# Patient Record
Sex: FEMALE | Race: WHITE | NOT HISPANIC OR LATINO | URBAN - METROPOLITAN AREA
[De-identification: names, ages, dates, MRNs, and addresses within clinical notes are randomized per-mention and may not be internally consistent; named-entity substitution may affect disease eponyms.]

---

## 2017-02-23 ENCOUNTER — EMERGENCY (EMERGENCY)
Facility: HOSPITAL | Age: 82
LOS: 1 days | Discharge: HOME | End: 2017-02-23
Attending: EMERGENCY MEDICINE | Admitting: EMERGENCY MEDICINE
Payer: MEDICARE

## 2017-02-23 VITALS
TEMPERATURE: 99 F | RESPIRATION RATE: 18 BRPM | OXYGEN SATURATION: 98 % | HEART RATE: 79 BPM | SYSTOLIC BLOOD PRESSURE: 137 MMHG | DIASTOLIC BLOOD PRESSURE: 97 MMHG

## 2017-02-23 VITALS
SYSTOLIC BLOOD PRESSURE: 110 MMHG | RESPIRATION RATE: 18 BRPM | OXYGEN SATURATION: 97 % | HEART RATE: 82 BPM | DIASTOLIC BLOOD PRESSURE: 60 MMHG

## 2017-02-23 DIAGNOSIS — Z90.49 ACQUIRED ABSENCE OF OTHER SPECIFIED PARTS OF DIGESTIVE TRACT: Chronic | ICD-10-CM

## 2017-02-23 DIAGNOSIS — M25.511 PAIN IN RIGHT SHOULDER: ICD-10-CM
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
## 2017-02-23 DIAGNOSIS — S42.031A DISPLACED FRACTURE OF LATERAL END OF RIGHT CLAVICLE, INITIAL ENCOUNTER FOR CLOSED FRACTURE: ICD-10-CM
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
## 2017-02-23 DIAGNOSIS — M70.62 TROCHANTERIC BURSITIS, LEFT HIP: ICD-10-CM

## 2017-02-23 PROCEDURE — 72192 CT PELVIS W/O DYE: CPT

## 2017-02-23 PROCEDURE — 76377 3D RENDER W/INTRP POSTPROCES: CPT | Mod: 26

## 2017-02-23 PROCEDURE — 73000 X-RAY EXAM OF COLLAR BONE: CPT

## 2017-02-23 PROCEDURE — 73522 X-RAY EXAM HIPS BI 3-4 VIEWS: CPT | Mod: 26

## 2017-02-23 PROCEDURE — 99284 EMERGENCY DEPT VISIT MOD MDM: CPT | Mod: GC

## 2017-02-23 PROCEDURE — 76377 3D RENDER W/INTRP POSTPROCES: CPT

## 2017-02-23 PROCEDURE — 71010: CPT | Mod: 26

## 2017-02-23 PROCEDURE — 73522 X-RAY EXAM HIPS BI 3-4 VIEWS: CPT

## 2017-02-23 PROCEDURE — 73030 X-RAY EXAM OF SHOULDER: CPT

## 2017-02-23 PROCEDURE — 71045 X-RAY EXAM CHEST 1 VIEW: CPT

## 2017-02-23 PROCEDURE — 99284 EMERGENCY DEPT VISIT MOD MDM: CPT | Mod: 25

## 2017-02-23 PROCEDURE — 73030 X-RAY EXAM OF SHOULDER: CPT | Mod: 26,RT

## 2017-02-23 PROCEDURE — 73000 X-RAY EXAM OF COLLAR BONE: CPT | Mod: 26,RT

## 2017-02-23 PROCEDURE — 72192 CT PELVIS W/O DYE: CPT | Mod: 26

## 2017-02-23 RX ORDER — ACETAMINOPHEN 500 MG
650 TABLET ORAL ONCE
Qty: 0 | Refills: 0 | Status: COMPLETED | OUTPATIENT
Start: 2017-02-23 | End: 2017-02-23

## 2017-02-23 RX ADMIN — Medication 650 MILLIGRAM(S): at 13:03

## 2017-02-23 RX ADMIN — Medication 650 MILLIGRAM(S): at 15:03

## 2017-02-23 NOTE — ED PROVIDER NOTE - CARE PLAN
Principal Discharge DX:	AC separation, right, initial encounter Principal Discharge DX:	Closed displaced fracture of acromial end of right clavicle, initial encounter  Instructions for follow-up, activity and diet:	1. Return to ED for worsening, progressive or any other concerning symptoms   2. Follow up with your primary care doctor in 2-3days   3. Take Tylenol up to 650 mg every 6 hours as needed for pain.   4. Rest, apply ice over covered skin for no more than 15 minutes at a time, keep affected extremity elevated, use compressive dressing or splint as provided and instructed.   5. Follow up with orthopedic clinic [phone]  Secondary Diagnosis:	Greater trochanteric bursitis, left Principal Discharge DX:	Closed displaced fracture of acromial end of right clavicle, initial encounter  Instructions for follow-up, activity and diet:	1. Return to ED for worsening, progressive or any other concerning symptoms   2. Follow up with your primary care doctor in 2-3days   3. Take Tylenol up to 650 mg every 6 hours as needed for pain.   4. Rest, apply ice over covered skin for no more than 15 minutes at a time, keep affected extremity elevated, use compressive dressing or splint as provided and instructed.   5. Follow up with orthopedic clinic [phone]  Secondary Diagnosis:	Greater trochanteric bursitis, left Principal Discharge DX:	Closed displaced fracture of acromial end of right clavicle, initial encounter  Instructions for follow-up, activity and diet:	1. Return to ED for worsening, progressive or any other concerning symptoms   2. Follow up with your primary care doctor in 2-3days   3. Take Tylenol up to 650 mg every 6 hours as needed for pain.   4. Rest, apply ice over covered skin for no more than 15 minutes at a time, keep affected extremity elevated, use compressive dressing or splint as provided and instructed.   5. Follow up with orthopedic clinic [phone]  Secondary Diagnosis:	Greater trochanteric bursitis, left

## 2017-02-23 NOTE — ED PROCEDURE NOTE - NS ED PERI NEURO NEG
The patient/caregiver verbalized understanding of how to care for the injured extremity with splint/Post-application: Motor, sensory, and vascular responses intact in the injured extremity.

## 2017-02-23 NOTE — ED PROCEDURE NOTE - NS ED PERI VASCULAR NEG
no paresthesia/no cyanosis of extremity/capillary refill time < 2 seconds/fingers/toes warm to touch/no swelling

## 2017-02-23 NOTE — ED PROVIDER NOTE - OBJECTIVE STATEMENT
89yo F with mechanical fall >1wk ago seen at [facility], not on A/C, had CXR/Xray pelvis and D/C told no fx. was unable to ambulate until monday, now ambulating but with a shuffling gait, pain and use of walker which didn't use prior. increased bruising/swelling rt shoulder, called PCP who ordered xray last night. has clavicle fx. sent to ED to be evaluated. patient has dementia, and chronic pain. unable to say if she has any new pain. at mental status baseline per family.

## 2017-02-23 NOTE — ED PROVIDER NOTE - PROGRESS NOTE DETAILS
will CT left hip due to questionable lucency/cortical defect to r/o fx -Slowey DO CT greater troch fracture on left side, no intertroch fx, will prepare for D/C. Waiting to hear from ortho regarding distal clavicle fracture due to significant displacement -Etelvina PEÑA CT recommending MRI to completely r/o interoch fx. discussion with patients family, they do not want admission for MRI. They understand the risk of returning home, walking on a potential fracture and if to become displaced requiring surgery and or resulting in being bed bound and ultimately death. With the understand of potential consequences the family will like to take the patient home. She is able to ambulate with walker -Etelvina PEÑA CT recommending MRI to completely r/o interoch fx. discussion with patients family, they do not want admission for MRI. They understand the risk of returning home, walking on a potential fracture and if to become displaced requiring surgery and/or resulting in being bed bound c complictions including deep venous thrombosis, PNA, UTI, and possibly death. With the understand of potential consequences the family will like to take the patient home. She is able to ambulate with walker -Etelvina PEÑA  Attending Statement: Agree with the above.  Had same conversation with patient's family who stated they would prefer D/C over MRI.  Long d/w them re potential complications and differences in complexity of treating nondisplaced v displaced hip fx. R/B/A discussed at length.  Aware of risks prior to d/c.  AC joint slinged, ortho f/u provided.  --STEVE

## 2017-02-23 NOTE — ED PROVIDER NOTE - DIAGNOSTIC INTERPRETATION
no clavicular of humeral fracture- significant AC separation   no hemo/pneumothorax   questionable cortical defect/lucency left hip concerning for fracture +distal clavicle fracture with displacement on rt  no hemo/pneumothorax   questionable cortical defect/lucency left hip concerning for fracture

## 2017-02-23 NOTE — ED PROVIDER NOTE - MEDICAL DECISION MAKING DETAILS
likely fracture clavicle vs AC separation, will get xrays and pelvis also to r/o fx. tylenol. sling. likely fracture clavicle vs AC separation, will get xrays and pelvis also to r/o fx. tylenol. sling.  Attending Statement: Agree with the above.  Likely distal clavicle fracture without skin tenting in 89 y F c h/o dementia; Ambulatory c shuffling steps; questionable fx on L hip XR.  Will CT hip and reassess.  Has 24 hr home health aid to assist at home.  --BMM

## 2017-02-23 NOTE — ED PROVIDER NOTE - PRINCIPAL DIAGNOSIS
AC separation, right, initial encounter Closed displaced fracture of acromial end of right clavicle, initial encounter

## 2017-02-23 NOTE — ED ADULT NURSE NOTE - OBJECTIVE STATEMENT
89 y/o female presents to the ED via EMS accompanied by family c/o a fall x 1 week ago was seen in [facility] and d/c'd home. Respirations even and nonlabored. Lungs cta b/l. Denies cp/sob. Abd soft nt nd +bsx4. +pulses +cap refill. Pt has dementia and in baseline mental status as per family. Pt's family states difficulty walking with walker and increase swelling and bruising on shoulder.

## 2017-02-23 NOTE — ED PROVIDER NOTE - PHYSICAL EXAMINATION
Gen: NAD, AOx1  Head: NCAT  HEENT: PERRL, oral mucosa moist, normal conjunctiva, neck supple  Lung: CTAB, no respiratory distress  CV: rrr, no murmur, Normal perfusion, +2 radial b/l   Abd: soft, NTND  MSK: +proximal migration of distal end of rt clavicle with overlying ecchymosis and ttp, no ttp humerus/elbow/hand on rt, FROM digit/elbow, rt shoulder limited due to pain but able to fully externally rotate and flex to 90degrees. no injury left shoulder. pain with ROM b/l hips. no pelvic ttp  Neuro: No focal neurologic deficits  Skin: No rash, see MSK

## 2019-03-21 ENCOUNTER — INPATIENT (INPATIENT)
Facility: HOSPITAL | Age: 84
LOS: 6 days | Discharge: NURSING, REHAB OR LONG-TERM CARE | DRG: 388 | End: 2019-03-28
Attending: SURGERY | Admitting: SURGERY
Payer: MEDICARE

## 2019-03-21 VITALS
HEIGHT: 62 IN | DIASTOLIC BLOOD PRESSURE: 72 MMHG | SYSTOLIC BLOOD PRESSURE: 156 MMHG | OXYGEN SATURATION: 97 % | WEIGHT: 87.96 LBS | HEART RATE: 82 BPM | RESPIRATION RATE: 16 BRPM

## 2019-03-21 DIAGNOSIS — Z90.49 ACQUIRED ABSENCE OF OTHER SPECIFIED PARTS OF DIGESTIVE TRACT: Chronic | ICD-10-CM

## 2019-03-21 LAB
ALBUMIN SERPL ELPH-MCNC: 3.6 G/DL — SIGNIFICANT CHANGE UP (ref 3.3–5)
ALP SERPL-CCNC: 51 U/L — SIGNIFICANT CHANGE UP (ref 40–120)
ALT FLD-CCNC: 10 U/L — SIGNIFICANT CHANGE UP (ref 10–45)
ANION GAP SERPL CALC-SCNC: 14 MMOL/L — SIGNIFICANT CHANGE UP (ref 5–17)
APTT BLD: 30 SEC — SIGNIFICANT CHANGE UP (ref 27.5–36.3)
AST SERPL-CCNC: 18 U/L — SIGNIFICANT CHANGE UP (ref 10–40)
BASOPHILS # BLD AUTO: 0 K/UL — SIGNIFICANT CHANGE UP (ref 0–0.2)
BASOPHILS NFR BLD AUTO: 0.5 % — SIGNIFICANT CHANGE UP (ref 0–2)
BILIRUB SERPL-MCNC: 0.7 MG/DL — SIGNIFICANT CHANGE UP (ref 0.2–1.2)
BLD GP AB SCN SERPL QL: NEGATIVE — SIGNIFICANT CHANGE UP
BUN SERPL-MCNC: 52 MG/DL — HIGH (ref 7–23)
CALCIUM SERPL-MCNC: 9.4 MG/DL — SIGNIFICANT CHANGE UP (ref 8.4–10.5)
CHLORIDE SERPL-SCNC: 94 MMOL/L — LOW (ref 96–108)
CO2 SERPL-SCNC: 30 MMOL/L — SIGNIFICANT CHANGE UP (ref 22–31)
CREAT SERPL-MCNC: 1.26 MG/DL — SIGNIFICANT CHANGE UP (ref 0.5–1.3)
EOSINOPHIL # BLD AUTO: 0 K/UL — SIGNIFICANT CHANGE UP (ref 0–0.5)
EOSINOPHIL NFR BLD AUTO: 0.3 % — SIGNIFICANT CHANGE UP (ref 0–6)
GAS PNL BLDV: SIGNIFICANT CHANGE UP
GLUCOSE SERPL-MCNC: 108 MG/DL — HIGH (ref 70–99)
HCT VFR BLD CALC: 45.9 % — HIGH (ref 34.5–45)
HGB BLD-MCNC: 14.8 G/DL — SIGNIFICANT CHANGE UP (ref 11.5–15.5)
INR BLD: 0.96 RATIO — SIGNIFICANT CHANGE UP (ref 0.88–1.16)
LYMPHOCYTES # BLD AUTO: 0.8 K/UL — LOW (ref 1–3.3)
LYMPHOCYTES # BLD AUTO: 10.6 % — LOW (ref 13–44)
MCHC RBC-ENTMCNC: 29.5 PG — SIGNIFICANT CHANGE UP (ref 27–34)
MCHC RBC-ENTMCNC: 32.1 GM/DL — SIGNIFICANT CHANGE UP (ref 32–36)
MCV RBC AUTO: 91.9 FL — SIGNIFICANT CHANGE UP (ref 80–100)
MONOCYTES # BLD AUTO: 1.4 K/UL — HIGH (ref 0–0.9)
MONOCYTES NFR BLD AUTO: 18.3 % — HIGH (ref 2–14)
NEUTROPHILS # BLD AUTO: 5.5 K/UL — SIGNIFICANT CHANGE UP (ref 1.8–7.4)
NEUTROPHILS NFR BLD AUTO: 70.4 % — SIGNIFICANT CHANGE UP (ref 43–77)
PLATELET # BLD AUTO: 205 K/UL — SIGNIFICANT CHANGE UP (ref 150–400)
POTASSIUM SERPL-MCNC: 3.8 MMOL/L — SIGNIFICANT CHANGE UP (ref 3.5–5.3)
POTASSIUM SERPL-SCNC: 3.8 MMOL/L — SIGNIFICANT CHANGE UP (ref 3.5–5.3)
PROT SERPL-MCNC: 7.5 G/DL — SIGNIFICANT CHANGE UP (ref 6–8.3)
PROTHROM AB SERPL-ACNC: 10.9 SEC — SIGNIFICANT CHANGE UP (ref 10–12.9)
RBC # BLD: 5 M/UL — SIGNIFICANT CHANGE UP (ref 3.8–5.2)
RBC # FLD: 13.3 % — SIGNIFICANT CHANGE UP (ref 10.3–14.5)
RH IG SCN BLD-IMP: POSITIVE — SIGNIFICANT CHANGE UP
SODIUM SERPL-SCNC: 138 MMOL/L — SIGNIFICANT CHANGE UP (ref 135–145)
WBC # BLD: 7.8 K/UL — SIGNIFICANT CHANGE UP (ref 3.8–10.5)
WBC # FLD AUTO: 7.8 K/UL — SIGNIFICANT CHANGE UP (ref 3.8–10.5)

## 2019-03-21 PROCEDURE — 71045 X-RAY EXAM CHEST 1 VIEW: CPT | Mod: 26

## 2019-03-21 PROCEDURE — 99285 EMERGENCY DEPT VISIT HI MDM: CPT | Mod: GC

## 2019-03-21 PROCEDURE — 74177 CT ABD & PELVIS W/CONTRAST: CPT | Mod: 26

## 2019-03-21 RX ORDER — ACETAMINOPHEN 500 MG
1000 TABLET ORAL ONCE
Qty: 0 | Refills: 0 | Status: COMPLETED | OUTPATIENT
Start: 2019-03-21 | End: 2019-03-21

## 2019-03-21 RX ORDER — SODIUM CHLORIDE 9 MG/ML
1000 INJECTION, SOLUTION INTRAVENOUS
Qty: 0 | Refills: 0 | Status: DISCONTINUED | OUTPATIENT
Start: 2019-03-21 | End: 2019-03-22

## 2019-03-21 RX ORDER — SODIUM CHLORIDE 9 MG/ML
1000 INJECTION, SOLUTION INTRAVENOUS ONCE
Qty: 0 | Refills: 0 | Status: COMPLETED | OUTPATIENT
Start: 2019-03-21 | End: 2019-03-21

## 2019-03-21 RX ORDER — ONDANSETRON 8 MG/1
4 TABLET, FILM COATED ORAL ONCE
Qty: 0 | Refills: 0 | Status: COMPLETED | OUTPATIENT
Start: 2019-03-21 | End: 2019-03-21

## 2019-03-21 RX ADMIN — Medication 400 MILLIGRAM(S): at 20:38

## 2019-03-21 RX ADMIN — Medication 1000 MILLIGRAM(S): at 21:00

## 2019-03-21 RX ADMIN — SODIUM CHLORIDE 2000 MILLILITER(S): 9 INJECTION, SOLUTION INTRAVENOUS at 22:30

## 2019-03-21 RX ADMIN — ONDANSETRON 4 MILLIGRAM(S): 8 TABLET, FILM COATED ORAL at 20:38

## 2019-03-21 NOTE — ED PROVIDER NOTE - ATTENDING CONTRIBUTION TO CARE
Attending MD Sanchez:  I personally have seen and examined this patient.  Resident note reviewed and agree on plan of care and except where noted.  See HPI, PE, and MDM for details.      Attending MD Sanchez:    Gen: thin frail elderly woman lying in stretcher in NAD, belching, awake and alert, follows commands  Neck: supple, no swelling, trachea midline  CV: heart with reg rhythm, no obvious murmur appreciated   Resp: CTAB, breathing comfortably  Abd: soft, NT, ND  Extremities: extremities warm to the touch, no peripheral edema   Msk: no extremity deformities or bony tenderness  Pysch: appropriate affect    Neuro: moves all extremities spontaneously, no gross motor or sensory deficits       89 yo F PMhx dementia, AAOx1 at baseline, colon CA with resection in 2003, appendectomy p/w N/V, XR as outpatient raises concern for possible bowel obstruction. Nontender abdomen but with moderate distention, plan for CT a/p to evaluate for SBO, colitis. IV fluids, antiemetics and reassessment Attending MD Sanchez:  I personally have seen and examined this patient.  Resident note reviewed and agree on plan of care and except where noted.  See HPI, PE, and MDM for details.      Attending MD Sanchez:    Gen: thin frail elderly woman lying in stretcher in NAD, belching, awake and alert, follows commands  Neck: supple, no swelling, trachea midline  CV: heart with reg rhythm, no obvious murmur appreciated   Resp: CTAB, breathing comfortably  Abd: soft, NT, moderate distention  Extremities: extremities warm to the touch, no peripheral edema   Msk: no extremity deformities or bony tenderness  Pysch: appropriate affect    Neuro: moves all extremities spontaneously, no gross motor or sensory deficits       89 yo F PMhx dementia, AAOx1 at baseline, colon CA with resection in 2003, appendectomy p/w N/V, XR as outpatient raises concern for possible bowel obstruction. Nontender abdomen but with moderate distention, plan for CT a/p to evaluate for SBO, colitis. IV fluids, antiemetics and reassessment

## 2019-03-21 NOTE — ED ADULT NURSE NOTE - NSIMPLEMENTINTERV_GEN_ALL_ED
Implemented All Fall with Harm Risk Interventions:  [place] to call system. Call bell, personal items and telephone within reach. Instruct patient to call for assistance. Room bathroom lighting operational. Non-slip footwear when patient is off stretcher. Physically safe environment: no spills, clutter or unnecessary equipment. Stretcher in lowest position, wheels locked, appropriate side rails in place. Provide visual cue, wrist band, yellow gown, etc. Monitor gait and stability. Monitor for mental status changes and reorient to person, place, and time. Review medications for side effects contributing to fall risk. Reinforce activity limits and safety measures with patient and family. Provide visual clues: red socks.

## 2019-03-21 NOTE — ED PROVIDER NOTE - PROGRESS NOTE DETAILS
Luis A Mortensen M.D. Resident: Pt with SBO, surgery paged attending Felipe: pt signed out to me by Dr. Sanchez at usual time of shift change with dispo pending for admission to surgery for SBO. Called to bedside after pt became hypoxic. Surgery attending Eldon Grande at bedside. NGT placed with >1,000 cc stomach contents suctioned. Chest PT performed, pt repositioned in bed. Pt with O2 sats high 80s on supplemental oxygen. Discussion of goals of care with daughter at bedside. Pt is DNR/DNI. Luis A Mortensen M.D. Resident: Discussed code status with patient's daughter, Soila, family does not want pt to be intubated if she develops respiratory distress and do not want chest compressions if patient goes into cardiac arrest. Family is focused on comfort measures.

## 2019-03-21 NOTE — ED PROVIDER NOTE - CLINICAL SUMMARY MEDICAL DECISION MAKING FREE TEXT BOX
89 yo F PMhx dementia, AAOx1 at baseline, colon CA with resection in 2003, appendectomy p/w N/V, DDx: SBO, GIB, will obtain CT, labs, meds, treat/dispo accordingly

## 2019-03-21 NOTE — ED ADULT NURSE NOTE - OBJECTIVE STATEMENT
Patient is a (  year old) (male/ female) complaining of (   ). Arrived by (EMS/ walk-in). Patient has history of ( ). Patient is (A&O x  ) and appears (  ). Pt reports (background story). Endorsing complaints of (     ). Denies complaints of (chest pain, sob, fevers, chills, n/v/d, headache, syncope, burning uriantion, blood in urine, blood in stool). Abd is (soft, non tender, non distended). Skin is (warm and dry). Color is (consistent with ethnicity). (VSS/ NAD). Safety and comfort maintained. (  at the bedside). Will continue to monitor. Patient is a 89 year old female complaining of abd pain, vomiting for 3 days. Arrived by EMS from home. Patient has history of colon CA with resection 2003, appendectomy, dementia, UTI, hypotension, depression. Patient is A&O x 1 and appears well. Pt family at bedside reports patient has had decreased po since Monday, has had clear emesis for 3 days, 1 episode of dark emesis today and 1 loose BM at 0200 witnessed by home aide. Endorsing complaints of abd pain and back pain. Was seen at home by visiting MD who felt palpable mass on abd and confirmed obstruction with abd XR. Pt also had CXR, family cannot remember results. On arrival, pt spo2 <95% on room air. MD Mortensen aware, pt on nasal cannula and spo2 improved >95% on 4L.  Denies complaints of chest pain, sob, fevers, chills, headache, syncope, burning urination, blood in urine, blood in stool. Abd is soft, non tender, non distended. Skin is warm, dry, intact. Color is consistent with ethnicity. VSS/ NAD. Safety and comfort maintained. Family at the bedside. Will continue to monitor.

## 2019-03-21 NOTE — ED PROVIDER NOTE - OBJECTIVE STATEMENT
89 yo F PMhx dementia, AAOx1 at baseline, colon CA with resection in 2003, appendectomy p/w N/V since Monday night. Pt has not been able to tolerate PO since Monday. She is not able to provide any information regarding her symptoms. Family, who is at bedside, state she had some dark vomitus today per her HHA. They called her home health doctor (Dr. Crisostomo) who ordered a home abdominal Xray which showed a "lower obstruction." Family was told to bring pt to ER.

## 2019-03-21 NOTE — ED PROVIDER NOTE - PHYSICAL EXAMINATION
Gen: AAOx0 - pt able to answer "no' when asked if she is having pain, non-toxic  Head: NCAT  HEENT: EOMI, oral mucosa dry, lips dry, normal conjunctiva  Lung: CTAB, no respiratory distress, no wheezes/rhonchi/rales B/L  CV: RRR, no murmurs, rubs or gallops  Abd: softly distended, nontender  MSK: no visible deformities  Neuro: No focal sensory or motor deficits  Skin: Warm, well perfused, no rash  Psych: normal affect.   ~Luis A Mortensen M.D. Resident

## 2019-03-22 DIAGNOSIS — R53.81 OTHER MALAISE: ICD-10-CM

## 2019-03-22 DIAGNOSIS — R52 PAIN, UNSPECIFIED: ICD-10-CM

## 2019-03-22 DIAGNOSIS — Z51.5 ENCOUNTER FOR PALLIATIVE CARE: ICD-10-CM

## 2019-03-22 DIAGNOSIS — R11.0 NAUSEA: ICD-10-CM

## 2019-03-22 DIAGNOSIS — K56.609 UNSPECIFIED INTESTINAL OBSTRUCTION, UNSPECIFIED AS TO PARTIAL VERSUS COMPLETE OBSTRUCTION: ICD-10-CM

## 2019-03-22 DIAGNOSIS — F03.90 UNSPECIFIED DEMENTIA WITHOUT BEHAVIORAL DISTURBANCE: ICD-10-CM

## 2019-03-22 LAB
ANION GAP SERPL CALC-SCNC: 14 MMOL/L — SIGNIFICANT CHANGE UP (ref 5–17)
BUN SERPL-MCNC: 51 MG/DL — HIGH (ref 7–23)
CA-I BLD-SCNC: 1.07 MMOL/L — LOW (ref 1.12–1.3)
CALCIUM SERPL-MCNC: 8.5 MG/DL — SIGNIFICANT CHANGE UP (ref 8.4–10.5)
CHLORIDE SERPL-SCNC: 93 MMOL/L — LOW (ref 96–108)
CO2 SERPL-SCNC: 34 MMOL/L — HIGH (ref 22–31)
CREAT SERPL-MCNC: 1.31 MG/DL — HIGH (ref 0.5–1.3)
GAS PNL BLDV: SIGNIFICANT CHANGE UP
GLUCOSE SERPL-MCNC: 75 MG/DL — SIGNIFICANT CHANGE UP (ref 70–99)
HCT VFR BLD CALC: 39.3 % — SIGNIFICANT CHANGE UP (ref 34.5–45)
HGB BLD-MCNC: 12.9 G/DL — SIGNIFICANT CHANGE UP (ref 11.5–15.5)
MAGNESIUM SERPL-MCNC: 2.1 MG/DL — SIGNIFICANT CHANGE UP (ref 1.6–2.6)
MCHC RBC-ENTMCNC: 29.9 PG — SIGNIFICANT CHANGE UP (ref 27–34)
MCHC RBC-ENTMCNC: 32.8 GM/DL — SIGNIFICANT CHANGE UP (ref 32–36)
MCV RBC AUTO: 91 FL — SIGNIFICANT CHANGE UP (ref 80–100)
PHOSPHATE SERPL-MCNC: 2.6 MG/DL — SIGNIFICANT CHANGE UP (ref 2.5–4.5)
PLATELET # BLD AUTO: 189 K/UL — SIGNIFICANT CHANGE UP (ref 150–400)
POTASSIUM SERPL-MCNC: 3 MMOL/L — LOW (ref 3.5–5.3)
POTASSIUM SERPL-MCNC: 3.6 MMOL/L — SIGNIFICANT CHANGE UP (ref 3.5–5.3)
POTASSIUM SERPL-SCNC: 3 MMOL/L — LOW (ref 3.5–5.3)
POTASSIUM SERPL-SCNC: 3.6 MMOL/L — SIGNIFICANT CHANGE UP (ref 3.5–5.3)
RBC # BLD: 4.32 M/UL — SIGNIFICANT CHANGE UP (ref 3.8–5.2)
RBC # FLD: 14.6 % — HIGH (ref 10.3–14.5)
SODIUM SERPL-SCNC: 141 MMOL/L — SIGNIFICANT CHANGE UP (ref 135–145)
WBC # BLD: 4.48 K/UL — SIGNIFICANT CHANGE UP (ref 3.8–10.5)
WBC # FLD AUTO: 4.48 K/UL — SIGNIFICANT CHANGE UP (ref 3.8–10.5)

## 2019-03-22 PROCEDURE — 99497 ADVNCD CARE PLAN 30 MIN: CPT | Mod: 25

## 2019-03-22 PROCEDURE — 71045 X-RAY EXAM CHEST 1 VIEW: CPT | Mod: 26

## 2019-03-22 PROCEDURE — 99223 1ST HOSP IP/OBS HIGH 75: CPT

## 2019-03-22 PROCEDURE — 99232 SBSQ HOSP IP/OBS MODERATE 35: CPT

## 2019-03-22 RX ORDER — ENOXAPARIN SODIUM 100 MG/ML
30 INJECTION SUBCUTANEOUS DAILY
Qty: 0 | Refills: 0 | Status: DISCONTINUED | OUTPATIENT
Start: 2019-03-22 | End: 2019-03-28

## 2019-03-22 RX ORDER — SODIUM CHLORIDE 9 MG/ML
500 INJECTION, SOLUTION INTRAVENOUS ONCE
Qty: 0 | Refills: 0 | Status: COMPLETED | OUTPATIENT
Start: 2019-03-22 | End: 2019-03-22

## 2019-03-22 RX ORDER — PIPERACILLIN AND TAZOBACTAM 4; .5 G/20ML; G/20ML
3.38 INJECTION, POWDER, LYOPHILIZED, FOR SOLUTION INTRAVENOUS EVERY 12 HOURS
Qty: 0 | Refills: 0 | Status: DISCONTINUED | OUTPATIENT
Start: 2019-03-22 | End: 2019-03-28

## 2019-03-22 RX ORDER — INFLUENZA VIRUS VACCINE 15; 15; 15; 15 UG/.5ML; UG/.5ML; UG/.5ML; UG/.5ML
0.5 SUSPENSION INTRAMUSCULAR ONCE
Qty: 0 | Refills: 0 | Status: DISCONTINUED | OUTPATIENT
Start: 2019-03-22 | End: 2019-03-28

## 2019-03-22 RX ORDER — PIPERACILLIN AND TAZOBACTAM 4; .5 G/20ML; G/20ML
3.38 INJECTION, POWDER, LYOPHILIZED, FOR SOLUTION INTRAVENOUS EVERY 8 HOURS
Qty: 0 | Refills: 0 | Status: DISCONTINUED | OUTPATIENT
Start: 2019-03-22 | End: 2019-03-22

## 2019-03-22 RX ORDER — POTASSIUM CHLORIDE 20 MEQ
10 PACKET (EA) ORAL
Qty: 0 | Refills: 0 | Status: COMPLETED | OUTPATIENT
Start: 2019-03-22 | End: 2019-03-22

## 2019-03-22 RX ORDER — SODIUM CHLORIDE 9 MG/ML
500 INJECTION, SOLUTION INTRAVENOUS
Qty: 0 | Refills: 0 | Status: COMPLETED | OUTPATIENT
Start: 2019-03-22 | End: 2019-03-22

## 2019-03-22 RX ORDER — SODIUM CHLORIDE 9 MG/ML
1000 INJECTION, SOLUTION INTRAVENOUS
Qty: 0 | Refills: 0 | Status: DISCONTINUED | OUTPATIENT
Start: 2019-03-22 | End: 2019-03-23

## 2019-03-22 RX ADMIN — SODIUM CHLORIDE 1000 MILLILITER(S): 9 INJECTION, SOLUTION INTRAVENOUS at 02:00

## 2019-03-22 RX ADMIN — Medication 100 MILLIEQUIVALENT(S): at 09:16

## 2019-03-22 RX ADMIN — PIPERACILLIN AND TAZOBACTAM 25 GRAM(S): 4; .5 INJECTION, POWDER, LYOPHILIZED, FOR SOLUTION INTRAVENOUS at 17:12

## 2019-03-22 RX ADMIN — SODIUM CHLORIDE 75 MILLILITER(S): 9 INJECTION, SOLUTION INTRAVENOUS at 05:05

## 2019-03-22 RX ADMIN — Medication 100 MILLIEQUIVALENT(S): at 11:04

## 2019-03-22 RX ADMIN — ENOXAPARIN SODIUM 30 MILLIGRAM(S): 100 INJECTION SUBCUTANEOUS at 13:01

## 2019-03-22 RX ADMIN — SODIUM CHLORIDE 250 MILLILITER(S): 9 INJECTION, SOLUTION INTRAVENOUS at 16:58

## 2019-03-22 RX ADMIN — SODIUM CHLORIDE 999 MILLILITER(S): 9 INJECTION, SOLUTION INTRAVENOUS at 05:05

## 2019-03-22 RX ADMIN — PIPERACILLIN AND TAZOBACTAM 25 GRAM(S): 4; .5 INJECTION, POWDER, LYOPHILIZED, FOR SOLUTION INTRAVENOUS at 05:05

## 2019-03-22 RX ADMIN — Medication 100 MILLIEQUIVALENT(S): at 13:11

## 2019-03-22 NOTE — PROGRESS NOTE ADULT - ASSESSMENT
89 yo woman with a hx of dementia (AOx1-2 at baseline), HTN, appendectomy, remote colon CA s/p right colectomy with ileocolic anastomosis in 2003 presents with N/V, admitted due to SBO with TP in the left pelvis. The patient desaturated in the ED prior to NGT placement, with The patient is DNR/DNI, with the family wishing to keep her comfortable. Will manage the obstruction non-operatively with NGT decompression and bowel rest. Patient also with JALEEL (Cr 1.26, baseline 0.64).    - FU palliative consult   - no standing pain medication  - cont supplemental oxygen to maintain saturation >82%  - NPO  - NGT to low continuous suction  - IVF LR @ 75, plasmalyte 500 cc bolus now due to JALEEL  - lovenox for VTE PPx, renally does in setting of JALEEL  - Zosyn  concern for aspiration        [id number]

## 2019-03-22 NOTE — ED ADULT NURSE REASSESSMENT NOTE - NS ED NURSE REASSESS COMMENT FT1
pt resting in bed, has wet gurgly cough, unable to cough up secretions. family at bedside. patient pulling out nasal canula, family informed to keep nasal cannula in nose. will continue to check on patient and ensure she is getting the supplemental O2. patient placed more upright and laying on L. side to spit secretions when able to and prevent aspiration. Family informed to keep patient NPO. MD Mortensen aware. Surgery team at bedside, family aware of SBO at this time and admission.

## 2019-03-22 NOTE — ED ADULT NURSE REASSESSMENT NOTE - NS ED NURSE REASSESS COMMENT FT1
per admitting MD Grande, keep patient off NRB mask and on NC. Spo2 same on NC and NRB mask (88%). Titrate o2 to patient comfort, as long as patient is not in respiratory distress per MD Grande.

## 2019-03-22 NOTE — H&P ADULT - NSHPLABSRESULTS_GEN_ALL_CORE
CBC (03-21 @ 20:29)                              14.8                           7.8     )----------------(  205        70.4  % Neutrophils, 10.6<L>% Lymphocytes, ANC: 5.5                                 45.9<H>                BMP (03-21 @ 20:29)             138     |  94<L>   |  52<H> 		Ca++ --      Ca 9.4                ---------------------------------( 108<H>		Mg --                 3.8     |  30      |  1.26  			Ph --        LFTs (03-21 @ 20:29)      TPro 7.5 / Alb 3.6 / TBili 0.7 / DBili -- / AST 18 / ALT 10 / AlkPhos 51    Coags (03-21 @ 20:29)  aPTT 30.0 / INR 0.96 / PT 10.9    ABG (03-21 @ 20:29)      /  /  /  /  / %     Lactate:   1.4    VBG (03-21 @ 20:29)     7.50<H> / 49 / 35 / 37<H> / 12.0<H> / 66<L>%      IMAGING:  < from: CT Abdomen and Pelvis w/ Oral Cont and w/ IV Cont (03.21.19 @ 21:57) >    IMPRESSION:     Small bowel obstruction with a transition point in the left pelvis.   Nonspecific minimal mesenteric edema and trace pelvic free fluid, which   can be seen in bowel congestion/ischemia. Recommend clinical correlation.    Increased size of the infrarenal aortic aneurysm since 6/6/2016.    Indeterminate left ovarian lesion. Neoplasm cannot be excluded especially   in a postmenopausal patient. Recommend follow-up.     Stable T11, L4 on L5 superior endplate compression fractures. New L3   superior endplate compression fracture since 6/6/2016. Recommend   comparison to interval outside study. . If clinically indicated,   follow-up MRI may be obtained for further evaluation.    Dependent opacities in the right > left lower lobes, which may represent   atelectasis. Recommend clinical correlation to assess aspiration.    < end of copied text >

## 2019-03-22 NOTE — H&P ADULT - HISTORY OF PRESENT ILLNESS
89 yo woman with a hx of dementia (AOx1-2 at baseline), HTN, appendectomy, remote colon CA s/p colectomy in 2003 presents with N/V x5 days beginning Monday. Family reports the patient has not been able to tolerate PO intake since that time, and initially believed the patient had a "stomach flu." The patient was evaluated by her home health doctor who ordered a KUB demonstrating dilated bowel and thus instructed family to bring the patient to the ED.    In the ED, the patient desaturated prior to NGT placement. NGT placement produced >1L of gastric contents. The patient was placed on supplemental oxygen and with saturations in the high 80s.

## 2019-03-22 NOTE — CONSULT NOTE ADULT - PROBLEM SELECTOR RECOMMENDATION 6
No living will or HCP in the chart  According to Family Health Care Decision Act, in the event of incapacity  MOLST in the chart but not filled out completely  Would recommend appropriate completion of the form  Incapacity form Obtained Collateral from Mir and Patricia; and HHA as well  No living will or HCP in the chart  According to Family Health Care Decision Act, in the event of incapacity all of the children will be of equivalent weight  There is a known POA without a health care decision making designee  Mir will search for this  There are 4 children  Soila Ang [phone]  Reji Tripp [phone]  Patricia Lezama [phone]  Orville Tripp [phone]  I explained the process about how some may defer decision making to streamline the process  MOLST in the chart but not filled out completely  Would recommend appropriate completion of the form  Incapacity form will be required additionally per primary team  ACP Time spent 16 min

## 2019-03-22 NOTE — H&P ADULT - ATTENDING COMMENTS
seen and examined 03-22-19 @ 0215    2003 @ [facility] - right hemicolectomy for colon cancer  no adjuvant chemo and her daughter believes that cancer was early stage    soft / NT / ND  midline laparotomy scar  reducible epigastric incisional hernia    SBO from post-op adhesions w/o evidence of bowel strangulation  -NPO / NG / IVF    hypoxemia secondary to aspiration pneumonitis  -empiric ABx for aspiration in the setting of SBO    goals of care  -The patient has dementia and currently lives home with an aid. She is DNR/DNI. I explained to her daughter that 75% or post-op SBO's resolve w/o surgery. The patient has moderate hypoxemia after recent aspiration event. If she develops respiratory distress, the daughter desires CMO to avoid unnecessary suffering. She realizes that this treatment will hasten death. The daughter does not want SICU care.  -Will consult palliative care this morning.

## 2019-03-22 NOTE — CONSULT NOTE ADULT - PROBLEM SELECTOR RECOMMENDATION 9
NG  Status  Emesis  QTc NG in place 300 cc in container as part of the decompression  Status: Controlled  Emesis: None  QTc < 500  Consider IV Zofran 4mg prn q8H prn

## 2019-03-22 NOTE — CONSULT NOTE ADULT - SUBJECTIVE AND OBJECTIVE BOX
HPI:  89 yo woman with a hx of dementia (AOx1-2 at baseline), HTN, appendectomy, remote colon CA s/p colectomy in 2003 presents with N/V x5 days beginning Monday. Family reports the patient has not been able to tolerate PO intake since that time, and initially believed the patient had a "stomach flu." The patient was evaluated by her home health doctor who ordered a KUB demonstrating dilated bowel and thus instructed family to bring the patient to the ED.    In the ED, the patient desaturated prior to NGT placement. NGT placement produced >1L of gastric contents. The patient was placed on supplemental oxygen and with saturations in the high 80s. (22 Mar 2019 03:12)    PERTINENT PM/SXH:   Colon cancer  Hypertension  Dementia    S/P right colectomy    FAMILY HISTORY:    ITEMS NOT CHECKED ARE NOT PRESENT    SOCIAL HISTORY:   Significant other/partner:  [ ]  Children:  [ ]  Spiritism/Spirituality:  Substance hx:  [ ]   Tobacco hx:  [ ]   Alcohol hx: [ ]   Home Opioid hx:  [ ] I-Stop Reference No:  Living Situation: [ ]Home  [ ]Long term care  [ ]Rehab [ ]Other    ADVANCE DIRECTIVES:    DNR  Yes  MOLST  [ ]  Living Will  [ ]   DECISION MAKER(s):  [ ] Health Care Proxy(s)  [ ] Surrogate(s)  [ ] Guardian           Name(s): Phone Number(s):    BASELINE (I)ADL(s) (prior to admission):  [place]: [ ]Total  [ ] Moderate [ ]Dependent    Allergies    No Known Allergies    Intolerances    MEDICATIONS  (STANDING):  enoxaparin Injectable 30 milliGRAM(s) SubCutaneous daily  influenza   Vaccine 0.5 milliLiter(s) IntraMuscular once  lactated ringers. 1000 milliLiter(s) (75 mL/Hr) IV Continuous <Continuous>  piperacillin/tazobactam IVPB. 3.375 Gram(s) IV Intermittent every 12 hours  potassium chloride  10 mEq/100 mL IVPB 10 milliEquivalent(s) IV Intermittent every 1 hour    MEDICATIONS  (PRN):    PRESENT SYMPTOMS: [ ]Unable to obtain due to poor mentation   Source if other than patient:  [ ]Family   [ ]Team     Pain (Impact on QOL):    Location -         Minimal acceptable level (0-10 scale):                    Aggravating factors -  Quality -  Radiation -  Severity (0-10 scale) -    Timing -    PAIN AD Score:     [email or web address]/[email or web address].pdf (press ctrl +  left click to view)    Dyspnea:                           [ ]Mild [ ]Moderate [ ]Severe  Anxiety:                             [ ]Mild [ ]Moderate [ ]Severe  Fatigue:                             [ ]Mild [ ]Moderate [ ]Severe  Nausea:                             [ ]Mild [ ]Moderate [ ]Severe  Loss of appetite:              [ ]Mild [ ]Moderate [ ]Severe  Constipation:                    [ ]Mild [ ]Moderate [ ]Severe    Other Symptoms:  [ ]All other review of systems negative     Karnofsky Performance Score/Palliative Performance Status Version 2:         %    http://palliative.info/resource_material/PPSv2.pdf  PHYSICAL EXAM:  Vital Signs Last 24 Hrs  T(C): 37.2 (22 Mar 2019 08:56), Max: 37.3 (21 Mar 2019 22:31)  T(F): 98.9 (22 Mar 2019 08:56), Max: 99.1 (21 Mar 2019 22:31)  HR: 92 (22 Mar 2019 08:56) (82 - 178)  BP: 100/68 (22 Mar 2019 08:56) (100/68 - 160/95)  BP(mean): --  RR: 16 (22 Mar 2019 08:56) (16 - 22)  SpO2: 88% (22 Mar 2019 08:56) (78% - 97%) I&O's Summary    21 Mar 2019 07:01  -  22 Mar 2019 07:00  --------------------------------------------------------  IN: 0 mL / OUT: 200 mL / NET: -200 mL    22 Mar 2019 07:01  -  22 Mar 2019 11:33  --------------------------------------------------------  IN: 0 mL / OUT: 0 mL / NET: 0 mL    GENERAL:  [ ]Alert  [ ]Oriented x   [ ]Lethargic  [ ]Cachexia  [ ]Unarousable  [ ]Verbal  [ ]Non-Verbal  Behavioral:   [ ] Anxiety  [ ] Delirium [ ] Agitation [ ] Other  HEENT:  [ ]Normal   [ ]Dry mouth   [ ]ET Tube/Trach  [ ]Oral lesions  PULMONARY:   [ ]Clear [ ]Tachypnea  [ ]Audible excessive secretions   [ ]Rhonchi        [ ]Right [ ]Left [ ]Bilateral  [ ]Crackles        [ ]Right [ ]Left [ ]Bilateral  [ ]Wheezing     [ ]Right [ ]Left [ ]Bilateral  CARDIOVASCULAR:    [ ]Regular [ ]Irregular [ ]Tachy  [ ]Mark [ ]Murmur [ ]Other  GASTROINTESTINAL:  [ ]Soft  [ ]Distended   [ ]+BS  [ ]Non tender [ ]Tender  [ ]PEG [ ]OGT/ NGT  Last BM:   GENITOURINARY:  [ ]Normal [ ] Incontinent   [ ]Oliguria/Anuria   [ ]Khan  MUSCULOSKELETAL:   [ ]Normal   [ ]Weakness  [ ]Bed/Wheelchair bound [ ]Edema  NEUROLOGIC:   [ ]No focal deficits  [ ] Cognitive impairment  [ ] Dysphagia [ ]Dysarthria [ ] Paresis [ ]Other   SKIN:   [ ]Normal   [ ]Pressure ulcer(s)  [ ]Rash    CRITICAL CARE:  [ ] Shock Present  [ ]Septic [ ]Cardiogenic [ ]Neurologic [ ]Hypovolemic  [ ]  Vasopressors [ ]  Inotropes   [ ] Respiratory failure present  [ ] Acute  [ ] Chronic [ ] Hypoxic  [ ] Hypercarbic [ ] Other  [ ] Other organ failure     LABS:                        12.9   4.48  )-----------( 189      ( 22 Mar 2019 08:58 )             39.3   03-22    141  |  93<L>  |  51<H>  ----------------------------<  75  3.0<L>   |  34<H>  |  1.31<H>    Ca    8.5      22 Mar 2019 07:15  Phos  2.6     03-22  Mg     2.1     03-22    TPro  7.5  /  Alb  3.6  /  TBili  0.7  /  DBili  x   /  AST  18  /  ALT  10  /  AlkPhos  51  03-21  PT/INR - ( 21 Mar 2019 20:29 )   PT: 10.9 sec;   INR: 0.96 ratio         PTT - ( 21 Mar 2019 20:29 )  PTT:30.0 sec      RADIOLOGY & ADDITIONAL STUDIES:    PROTEIN CALORIE MALNUTRITION PRESENT: [ ] Yes [ ] No  [ ] PPSV2 < or = to 30% [ ] significant weight loss  [ ] poor nutritional intake [ ] catabolic state [ ] anasarca     Albumin, Serum: 3.6 g/dL (03-21-19 @ 20:29)  Artificial Nutrition [ ]     REFERRALS:   [ ]Chaplaincy  [ ] Hospice  [ ]Child Life  [ ]Social Work  [ ]Case management [ ]Holistic Therapy   Goals of Care Discussion Document: HPI:  89 yo woman with a hx of dementia (AOx1-2 at baseline), HTN, appendectomy, remote colon CA s/p colectomy in 2003 presents with N/V x5 days beginning Monday. Family reports the patient has not been able to tolerate PO intake since that time, and initially believed the patient had a "stomach flu." The patient was evaluated by her home health doctor who ordered a KUB demonstrating dilated bowel and thus instructed family to bring the patient to the ED.    In the ED, the patient desaturated prior to NGT placement. NGT placement produced >1L of gastric contents. The patient was placed on supplemental oxygen and with saturations in the high 80s. (22 Mar 2019 03:12)    Pt is obtunded      PERTINENT PM/SXH:   Colon cancer  Hypertension  Dementia    S/P right colectomy    FAMILY HISTORY:    ITEMS NOT CHECKED ARE NOT PRESENT    SOCIAL HISTORY:   Significant other/partner:  [ ]  Children:  [x ]  Evangelical/Spirituality:  Substance hx:  [ ]   Tobacco hx:  [ ]   Alcohol hx: [ ]   Home Opioid hx:  [ ] I-Stop Reference No:  Living Situation: [x ]Home  [ ]Long term care  [ ]Rehab [ ]Other    ADVANCE DIRECTIVES:    DNR  Yes  MOLST  [ ]  Living Will  [ ]   DECISION MAKER(s):  [ ] Health Care Proxy(s)  [x ] Surrogate(s)  [ ] Guardian           Name(s): Phone Number(s):    BASELINE (I)ADL(s) (prior to admission):  [place]: [ ]Total  [ ] Moderate [ ]Dependent    Allergies    No Known Allergies    Intolerances    MEDICATIONS  (STANDING):  enoxaparin Injectable 30 milliGRAM(s) SubCutaneous daily  influenza   Vaccine 0.5 milliLiter(s) IntraMuscular once  lactated ringers. 1000 milliLiter(s) (75 mL/Hr) IV Continuous <Continuous>  piperacillin/tazobactam IVPB. 3.375 Gram(s) IV Intermittent every 12 hours  potassium chloride  10 mEq/100 mL IVPB 10 milliEquivalent(s) IV Intermittent every 1 hour    MEDICATIONS  (PRN):    PRESENT SYMPTOMS: [x ]Unable to obtain due to poor mentation   Source if other than patient:  [ ]Family   [ ]Team     Pain (Impact on QOL):    Location -         Minimal acceptable level (0-10 scale):                    Aggravating factors -  Quality -  Radiation -  Severity (0-10 scale) -    Timing -    PAIN AD Score:     [email or web address]/painad.pdf (press ctrl +  left click to view)    Dyspnea:                           [ ]Mild [ ]Moderate [ ]Severe  Anxiety:                             [ ]Mild [ ]Moderate [ ]Severe  Fatigue:                             [ ]Mild [ ]Moderate [ ]Severe  Nausea:                             [ ]Mild [ ]Moderate [ ]Severe  Loss of appetite:              [ ]Mild [ ]Moderate [ ]Severe  Constipation:                    [ ]Mild [ ]Moderate [ ]Severe    Other Symptoms:  [ x]All other review of systems negative     Karnofsky Performance Score/Palliative Performance Status Version 2:      30   %    http://palliative.info/resource_material/PPSv2.pdf  PHYSICAL EXAM:  Vital Signs Last 24 Hrs  T(C): 37.2 (22 Mar 2019 08:56), Max: 37.3 (21 Mar 2019 22:31)  T(F): 98.9 (22 Mar 2019 08:56), Max: 99.1 (21 Mar 2019 22:31)  HR: 92 (22 Mar 2019 08:56) (82 - 178)  BP: 100/68 (22 Mar 2019 08:56) (100/68 - 160/95)  BP(mean): --  RR: 16 (22 Mar 2019 08:56) (16 - 22)  SpO2: 88% (22 Mar 2019 08:56) (78% - 97%) I&O's Summary    21 Mar 2019 07:01  -  22 Mar 2019 07:00  --------------------------------------------------------  IN: 0 mL / OUT: 200 mL / NET: -200 mL    22 Mar 2019 07:01  -  22 Mar 2019 11:33  --------------------------------------------------------  IN: 0 mL / OUT: 0 mL / NET: 0 mL    GENERAL:  [ ]Alert  [ ]Oriented x   [ ]Lethargic  [ ]Cachexia  [ x]Unarousable  [ ]Verbal  [ ]Non-Verbal  Behavioral:   [ ] Anxiety  [x ] Delirium [ ] Agitation [ ] Other  HEENT:  [ ]Normal   [ ]Dry mouth   [ ]ET Tube/Trach  [ ]Oral lesions NG tube  PULMONARY:   [x ]Clear [ ]Tachypnea  [ ]Audible excessive secretions   [ ]Rhonchi        [ ]Right [ ]Left [ ]Bilateral  [ ]Crackles        [ ]Right [ ]Left [ ]Bilateral  [ ]Wheezing     [ ]Right [ ]Left [ ]Bilateral  CARDIOVASCULAR:    [x ]Regular [ ]Irregular [ ]Tachy  [ ]Mark [ ]Murmur [ ]Other  GASTROINTESTINAL:  [ ]Soft  [ ]Distended   [ ]+BS  [x ]Non tender [ ]Tender  [ ]PEG [ ]OGT/ NGT  Last BM:   GENITOURINARY:  [x ]Normal [ ] Incontinent   [ ]Oliguria/Anuria   [ ]Khan  MUSCULOSKELETAL:   [ ]Normal   [x ]Weakness  [ ]Bed/Wheelchair bound [ ]Edema  NEUROLOGIC:   [ ]No focal deficits  [x ] Cognitive impairment  [ ] Dysphagia [ ]Dysarthria [ ] Paresis [ ]Other   SKIN:   [ ]Normal   [ ]Pressure ulcer(s)  [ ]Rash    CRITICAL CARE:  [ ] Shock Present  [ ]Septic [ ]Cardiogenic [ ]Neurologic [ ]Hypovolemic  [ ]  Vasopressors [ ]  Inotropes   [ ] Respiratory failure present  [ ] Acute  [ ] Chronic [ ] Hypoxic  [ ] Hypercarbic [ ] Other  [ ] Other organ failure     LABS:                        12.9   4.48  )-----------( 189      ( 22 Mar 2019 08:58 )             39.3   03-22    141  |  93<L>  |  51<H>  ----------------------------<  75  3.0<L>   |  34<H>  |  1.31<H>    Ca    8.5      22 Mar 2019 07:15  Phos  2.6     03-22  Mg     2.1     03-22    TPro  7.5  /  Alb  3.6  /  TBili  0.7  /  DBili  x   /  AST  18  /  ALT  10  /  AlkPhos  51  03-21  PT/INR - ( 21 Mar 2019 20:29 )   PT: 10.9 sec;   INR: 0.96 ratio         PTT - ( 21 Mar 2019 20:29 )  PTT:30.0 sec      RADIOLOGY & ADDITIONAL STUDIES:    PROTEIN CALORIE MALNUTRITION PRESENT: [ ] Yes [ ] No  [ ] PPSV2 < or = to 30% [ ] significant weight loss  [ ] poor nutritional intake [ ] catabolic state [ ] anasarca     Albumin, Serum: 3.6 g/dL (03-21-19 @ 20:29)  Artificial Nutrition [ ]     REFERRALS:   [ ]Chaplaincy  [ ] Hospice  [ ]Child Life  [ ]Social Work  [ ]Case management [ ]Holistic Therapy   Goals of Care Discussion Document:

## 2019-03-22 NOTE — CONSULT NOTE ADULT - ASSESSMENT
HPI:  89 yo woman with a hx of dementia (AOx1-2 at baseline), HTN, appendectomy, remote colon CA s/p colectomy in 2003 presents with N/V x5 days beginning Monday consulted for assistance with complex medical decision making in the setting of a patient with dementia that has SBO

## 2019-03-22 NOTE — PROGRESS NOTE ADULT - SUBJECTIVE AND OBJECTIVE BOX
S: Pt seen and examined. Pt AOx1.  Daughter denies pt had any gas nor BM overnight    Vital Signs Last 24 Hrs  T(C): 37.1 (22 Mar 2019 04:41), Max: 37.3 (21 Mar 2019 22:31)  T(F): 98.7 (22 Mar 2019 04:41), Max: 99.1 (21 Mar 2019 22:31)  HR: 99 (22 Mar 2019 04:41) (82 - 178)  BP: 100/68 (22 Mar 2019 04:41) (100/68 - 160/95)  BP(mean): --  RR: 22 (22 Mar 2019 04:41) (16 - 22)  SpO2: 85% (22 Mar 2019 04:41) (78% - 97%)        General Appearance: Appears well, NAD   HEENT: NGT in place NG container 200cc green bilious. NGT flushed  Neck: midline trach  Chest: Equal expansion bilaterally, equal breath sounds  Abdomen: Soft, nontense, ND not grimacing on deep palpation, no guarding no rebound  Extremities: Grossly symmetric, SCD's in place     I&O's Summary    I&O's Detail      MEDICATIONS  (STANDING):  enoxaparin Injectable 30 milliGRAM(s) SubCutaneous daily  influenza   Vaccine 0.5 milliLiter(s) IntraMuscular once  lactated ringers. 1000 milliLiter(s) (75 mL/Hr) IV Continuous <Continuous>  piperacillin/tazobactam IVPB. 3.375 Gram(s) IV Intermittent every 12 hours    MEDICATIONS  (PRN):      LABS:                        14.8   7.8   )-----------( 205      ( 21 Mar 2019 20:29 )             45.9     03-21    138  |  94<L>  |  52<H>  ----------------------------<  108<H>  3.8   |  30  |  1.26    Ca    9.4      21 Mar 2019 20:29    TPro  7.5  /  Alb  3.6  /  TBili  0.7  /  DBili  x   /  AST  18  /  ALT  10  /  AlkPhos  51  03-21    PT/INR - ( 21 Mar 2019 20:29 )   PT: 10.9 sec;   INR: 0.96 ratio         PTT - ( 21 Mar 2019 20:29 )  PTT:30.0 sec S: Pt seen and examined. Pt AOx1.  Daughter denies pt had any gas nor BM overnight    Vital Signs Last 24 Hrs  T(C): 37.1 (22 Mar 2019 04:41), Max: 37.3 (21 Mar 2019 22:31)  T(F): 98.7 (22 Mar 2019 04:41), Max: 99.1 (21 Mar 2019 22:31)  HR: 99 (22 Mar 2019 04:41) (82 - 178)  BP: 100/68 (22 Mar 2019 04:41) (100/68 - 160/95)  BP(mean): --  RR: 22 (22 Mar 2019 04:41) (16 - 22)  SpO2: 85% (22 Mar 2019 04:41) (78% - 97%)        General Appearance: Appears well, NAD   HEENT: NGT in place NG container 200cc green bilious. NGT flushed  Neck: midline trach  Chest: Equal expansion bilaterally,   Abdomen: Soft, nontense, ND not grimacing on deep palpation, no guarding no rebound  Extremities: Grossly symmetric, SCD's in place     I&O's Summary    I&O's Detail      MEDICATIONS  (STANDING):  enoxaparin Injectable 30 milliGRAM(s) SubCutaneous daily  influenza   Vaccine 0.5 milliLiter(s) IntraMuscular once  lactated ringers. 1000 milliLiter(s) (75 mL/Hr) IV Continuous <Continuous>  piperacillin/tazobactam IVPB. 3.375 Gram(s) IV Intermittent every 12 hours    MEDICATIONS  (PRN):      LABS:                        14.8   7.8   )-----------( 205      ( 21 Mar 2019 20:29 )             45.9     03-21    138  |  94<L>  |  52<H>  ----------------------------<  108<H>  3.8   |  30  |  1.26    Ca    9.4      21 Mar 2019 20:29    TPro  7.5  /  Alb  3.6  /  TBili  0.7  /  DBili  x   /  AST  18  /  ALT  10  /  AlkPhos  51  03-21    PT/INR - ( 21 Mar 2019 20:29 )   PT: 10.9 sec;   INR: 0.96 ratio         PTT - ( 21 Mar 2019 20:29 )  PTT:30.0 sec

## 2019-03-22 NOTE — H&P ADULT - ASSESSMENT
89 yo woman with a hx of dementia (AOx1-2 at baseline), HTN, appendectomy, remote colon CA s/p right colectomy with ileocolic anastomosis in 2003 presents with N/V, admitted due to SBO with TP in the left pelvis. The patient desaturated in the ED prior to NGT placement, with The patient is DNR/DNI, with the family wishing to keep her comfortable. Will manage the obstruction non-operatively with NGT decompression and bowel rest. Patient also with JALEEL (Cr 1.26, baseline 0.64).    - admit to Dr. Grande, ATP [id number]  - no standing pain medication  - cont supplemental oxygen to maintain saturation >82%  - NPO  - NGT to low continuous suction  - IVF LR @ 75, plasmalyte 500 cc bolus now due to JALEEL  - lovenox for VTE PPx, renally does in setting of JALEEL  - zosyn     Patient evaluated with Dr. Ang and Dr. Grande 89 yo woman with a hx of dementia (AOx1-2 at baseline), HTN, appendectomy, remote colon CA s/p right colectomy with ileocolic anastomosis in 2003 presents with N/V, admitted due to SBO with TP in the left pelvis. The patient desaturated in the ED prior to NGT placement, with The patient is DNR/DNI, with the family wishing to keep her comfortable. Will manage the obstruction non-operatively with NGT decompression and bowel rest. Patient also with JALEEL (Cr 1.26, baseline 0.64).    - admit to Dr. Grande, ATP [id number]  - palliative consult in am  - no standing pain medication  - cont supplemental oxygen to maintain saturation >82%  - NPO  - NGT to low continuous suction  - IVF LR @ 75, plasmalyte 500 cc bolus now due to JALEEL  - lovenox for VTE PPx, renally does in setting of JALEEL  - zosyn     Patient evaluated with Dr. Ang and Dr. Grande

## 2019-03-22 NOTE — CONSULT NOTE ADULT - CONSULT REASON
Assistance with complex medical decision making in the setting of a patient with dementia that has SBO

## 2019-03-22 NOTE — ED ADULT NURSE REASSESSMENT NOTE - NS ED NURSE REASSESS COMMENT FT1
at 0215, patient found to be tachycardic SVT to 178 with low spo2 82% on 4L NC. MD Wang at bedside and aware. patient has wet gurgling lung sounds. patient placed on NRB mask. NGT placed by MD Grande from surgery at this time. 1500cc dark brown output. family at bedside. patient in NAD. tolerated NGT placement well. safety and comfort maintained. will continue to monitor. repeat CXR to confirm placement of NGT at this time.

## 2019-03-22 NOTE — CONSULT NOTE ADULT - PROBLEM SELECTOR RECOMMENDATION 4
Working diagnosis is secondary to adhesions  IVF Working diagnosis is secondary to adhesions  IVF  The daughter Patricia is very hopeful for recovery and was surprised when we discussed to possibility of it not reversing with conservative measures  She is hopeful for a continuation of the trial

## 2019-03-22 NOTE — CONSULT NOTE ADULT - PROBLEM SELECTOR RECOMMENDATION 2
Status  Consider IV dilaudid 0.2mg q4H PRN due to  Consider APAP trial 3 mg maximum Status: Controlled  None elicited  Consider IV dilaudid 0.2mg q4H PRN   Consider APAP trial 3 mg maximum IV PRN, 1000mg q8H PRN

## 2019-03-23 LAB
ANION GAP SERPL CALC-SCNC: 16 MMOL/L — SIGNIFICANT CHANGE UP (ref 5–17)
BUN SERPL-MCNC: 34 MG/DL — HIGH (ref 7–23)
CA-I BLD-SCNC: 1.11 MMOL/L — LOW (ref 1.12–1.3)
CALCIUM SERPL-MCNC: 8.6 MG/DL — SIGNIFICANT CHANGE UP (ref 8.4–10.5)
CHLORIDE SERPL-SCNC: 96 MMOL/L — SIGNIFICANT CHANGE UP (ref 96–108)
CO2 SERPL-SCNC: 34 MMOL/L — HIGH (ref 22–31)
CREAT SERPL-MCNC: 1.08 MG/DL — SIGNIFICANT CHANGE UP (ref 0.5–1.3)
GLUCOSE SERPL-MCNC: 58 MG/DL — LOW (ref 70–99)
HCT VFR BLD CALC: 37 % — SIGNIFICANT CHANGE UP (ref 34.5–45)
HGB BLD-MCNC: 11.9 G/DL — SIGNIFICANT CHANGE UP (ref 11.5–15.5)
LACTATE SERPL-SCNC: 1.2 MMOL/L — SIGNIFICANT CHANGE UP (ref 0.7–2)
MAGNESIUM SERPL-MCNC: 2.2 MG/DL — SIGNIFICANT CHANGE UP (ref 1.6–2.6)
MCHC RBC-ENTMCNC: 29.8 PG — SIGNIFICANT CHANGE UP (ref 27–34)
MCHC RBC-ENTMCNC: 32.2 GM/DL — SIGNIFICANT CHANGE UP (ref 32–36)
MCV RBC AUTO: 92.5 FL — SIGNIFICANT CHANGE UP (ref 80–100)
PHOSPHATE SERPL-MCNC: 1.9 MG/DL — LOW (ref 2.5–4.5)
PLATELET # BLD AUTO: 193 K/UL — SIGNIFICANT CHANGE UP (ref 150–400)
POTASSIUM SERPL-MCNC: 3 MMOL/L — LOW (ref 3.5–5.3)
POTASSIUM SERPL-SCNC: 3 MMOL/L — LOW (ref 3.5–5.3)
RBC # BLD: 4 M/UL — SIGNIFICANT CHANGE UP (ref 3.8–5.2)
RBC # FLD: 14.5 % — SIGNIFICANT CHANGE UP (ref 10.3–14.5)
SODIUM SERPL-SCNC: 146 MMOL/L — HIGH (ref 135–145)
WBC # BLD: 12.52 K/UL — HIGH (ref 3.8–10.5)
WBC # FLD AUTO: 12.52 K/UL — HIGH (ref 3.8–10.5)

## 2019-03-23 PROCEDURE — 99232 SBSQ HOSP IP/OBS MODERATE 35: CPT

## 2019-03-23 RX ORDER — POTASSIUM CHLORIDE 20 MEQ
10 PACKET (EA) ORAL
Qty: 0 | Refills: 0 | Status: COMPLETED | OUTPATIENT
Start: 2019-03-23 | End: 2019-03-23

## 2019-03-23 RX ORDER — PANTOPRAZOLE SODIUM 20 MG/1
40 TABLET, DELAYED RELEASE ORAL DAILY
Qty: 0 | Refills: 0 | Status: DISCONTINUED | OUTPATIENT
Start: 2019-03-23 | End: 2019-03-27

## 2019-03-23 RX ORDER — DEXTROSE MONOHYDRATE, SODIUM CHLORIDE, AND POTASSIUM CHLORIDE 50; .745; 4.5 G/1000ML; G/1000ML; G/1000ML
1000 INJECTION, SOLUTION INTRAVENOUS
Qty: 0 | Refills: 0 | Status: DISCONTINUED | OUTPATIENT
Start: 2019-03-23 | End: 2019-03-27

## 2019-03-23 RX ADMIN — PANTOPRAZOLE SODIUM 40 MILLIGRAM(S): 20 TABLET, DELAYED RELEASE ORAL at 12:43

## 2019-03-23 RX ADMIN — Medication 62.5 MILLIMOLE(S): at 14:31

## 2019-03-23 RX ADMIN — Medication 100 MILLIEQUIVALENT(S): at 18:45

## 2019-03-23 RX ADMIN — PIPERACILLIN AND TAZOBACTAM 25 GRAM(S): 4; .5 INJECTION, POWDER, LYOPHILIZED, FOR SOLUTION INTRAVENOUS at 05:24

## 2019-03-23 RX ADMIN — ENOXAPARIN SODIUM 30 MILLIGRAM(S): 100 INJECTION SUBCUTANEOUS at 12:38

## 2019-03-23 RX ADMIN — DEXTROSE MONOHYDRATE, SODIUM CHLORIDE, AND POTASSIUM CHLORIDE 75 MILLILITER(S): 50; .745; 4.5 INJECTION, SOLUTION INTRAVENOUS at 14:31

## 2019-03-23 RX ADMIN — PIPERACILLIN AND TAZOBACTAM 25 GRAM(S): 4; .5 INJECTION, POWDER, LYOPHILIZED, FOR SOLUTION INTRAVENOUS at 18:15

## 2019-03-23 NOTE — PROGRESS NOTE ADULT - ASSESSMENT
89 yo woman with a hx of dementia (AOx1-2 at baseline), HTN, appendectomy, remote colon CA s/p right colectomy with ileocolic anastomosis in 2003 presents with N/V, admitted due to SBO with TP in the left pelvis. The patient is DNR/DNI, with the family wishing to keep her comfortable. Will manage the obstruction non-operatively with NGT decompression and bowel rest    -pain medication only after exam  -monitor for GI fxn  -f/u AM labs  -lovenox for DVT ppx  -NPO/IVF  -NGT to low continuous suction    ACS Surgery  [id number]

## 2019-03-23 NOTE — PROGRESS NOTE ADULT - SUBJECTIVE AND OBJECTIVE BOX
Surgery Progress Note    S: Patient seen and examined. No acute events overnight. Patient was seen by palliative care yesterday who discussed care with patient and family. - flatus, - BM, as reported by family and nursing staff.     O:  Vital Signs Last 24 Hrs  T(C): 37.5 (22 Mar 2019 21:17), Max: 37.5 (22 Mar 2019 21:17)  T(F): 99.5 (22 Mar 2019 21:17), Max: 99.5 (22 Mar 2019 21:17)  HR: 80 (22 Mar 2019 21:17) (80 - 178)  BP: 97/53 (22 Mar 2019 21:17) (96/55 - 160/95)  BP(mean): --  RR: 19 (22 Mar 2019 21:17) (16 - 22)  SpO2: 91% (22 Mar 2019 21:17) (78% - 93%)    I&O's Detail    21 Mar 2019 07:01  -  22 Mar 2019 07:00  --------------------------------------------------------  IN:  Total IN: 0 mL    OUT:    Nasoenteral Tube: 200 mL  Total OUT: 200 mL    Total NET: -200 mL      22 Mar 2019 07:01  -  23 Mar 2019 00:23  --------------------------------------------------------  IN:    IV PiggyBack: 400 mL    Lactated Ringers IV Bolus: 500 mL    lactated ringers.: 900 mL  Total IN: 1800 mL    OUT:    Nasoenteral Tube: 750 mL  Total OUT: 750 mL    Total NET: 1050 mL          MEDICATIONS  (STANDING):  enoxaparin Injectable 30 milliGRAM(s) SubCutaneous daily  influenza   Vaccine 0.5 milliLiter(s) IntraMuscular once  lactated ringers. 1000 milliLiter(s) (75 mL/Hr) IV Continuous <Continuous>  piperacillin/tazobactam IVPB. 3.375 Gram(s) IV Intermittent every 12 hours    MEDICATIONS  (PRN):                            12.9   4.48  )-----------( 189      ( 22 Mar 2019 08:58 )             39.3       03-22    x   |  x   |  x   ----------------------------<  x   3.6   |  x   |  x     Ca    8.5      22 Mar 2019 07:15  Phos  2.6     03-22  Mg     2.1     03-22    TPro  7.5  /  Alb  3.6  /  TBili  0.7  /  DBili  x   /  AST  18  /  ALT  10  /  AlkPhos  51  03-21      Physical Exam:  Gen: Laying in bed, NAD, NGT to suction with bilious output  Resp: Unlabored breathing  Abd: soft, NT, mildly distended, no rebound or guarding  Ext: WWP  Skin: No rashes

## 2019-03-24 LAB
ANION GAP SERPL CALC-SCNC: 12 MMOL/L — SIGNIFICANT CHANGE UP (ref 5–17)
BUN SERPL-MCNC: 18 MG/DL — SIGNIFICANT CHANGE UP (ref 7–23)
CA-I BLD-SCNC: 1.01 MMOL/L — LOW (ref 1.12–1.3)
CALCIUM SERPL-MCNC: 8.5 MG/DL — SIGNIFICANT CHANGE UP (ref 8.4–10.5)
CHLORIDE SERPL-SCNC: 99 MMOL/L — SIGNIFICANT CHANGE UP (ref 96–108)
CO2 SERPL-SCNC: 34 MMOL/L — HIGH (ref 22–31)
CREAT SERPL-MCNC: 0.83 MG/DL — SIGNIFICANT CHANGE UP (ref 0.5–1.3)
GLUCOSE SERPL-MCNC: 139 MG/DL — HIGH (ref 70–99)
HCT VFR BLD CALC: 39.2 % — SIGNIFICANT CHANGE UP (ref 34.5–45)
HGB BLD-MCNC: 12.6 G/DL — SIGNIFICANT CHANGE UP (ref 11.5–15.5)
LACTATE SERPL-SCNC: 1.4 MMOL/L — SIGNIFICANT CHANGE UP (ref 0.7–2)
MAGNESIUM SERPL-MCNC: 2 MG/DL — SIGNIFICANT CHANGE UP (ref 1.6–2.6)
MCHC RBC-ENTMCNC: 29.9 PG — SIGNIFICANT CHANGE UP (ref 27–34)
MCHC RBC-ENTMCNC: 32.1 GM/DL — SIGNIFICANT CHANGE UP (ref 32–36)
MCV RBC AUTO: 92.9 FL — SIGNIFICANT CHANGE UP (ref 80–100)
PHOSPHATE SERPL-MCNC: 1.7 MG/DL — LOW (ref 2.5–4.5)
PLATELET # BLD AUTO: 203 K/UL — SIGNIFICANT CHANGE UP (ref 150–400)
POTASSIUM SERPL-MCNC: 2.7 MMOL/L — CRITICAL LOW (ref 3.5–5.3)
POTASSIUM SERPL-MCNC: 3.6 MMOL/L — SIGNIFICANT CHANGE UP (ref 3.5–5.3)
POTASSIUM SERPL-SCNC: 2.7 MMOL/L — CRITICAL LOW (ref 3.5–5.3)
POTASSIUM SERPL-SCNC: 3.6 MMOL/L — SIGNIFICANT CHANGE UP (ref 3.5–5.3)
RBC # BLD: 4.22 M/UL — SIGNIFICANT CHANGE UP (ref 3.8–5.2)
RBC # FLD: 14.6 % — HIGH (ref 10.3–14.5)
SODIUM SERPL-SCNC: 145 MMOL/L — SIGNIFICANT CHANGE UP (ref 135–145)
WBC # BLD: 13.53 K/UL — HIGH (ref 3.8–10.5)
WBC # FLD AUTO: 13.53 K/UL — HIGH (ref 3.8–10.5)

## 2019-03-24 PROCEDURE — 74018 RADEX ABDOMEN 1 VIEW: CPT | Mod: 26

## 2019-03-24 RX ORDER — POTASSIUM PHOSPHATE, MONOBASIC POTASSIUM PHOSPHATE, DIBASIC 236; 224 MG/ML; MG/ML
30 INJECTION, SOLUTION INTRAVENOUS ONCE
Qty: 0 | Refills: 0 | Status: COMPLETED | OUTPATIENT
Start: 2019-03-24 | End: 2019-03-24

## 2019-03-24 RX ORDER — POTASSIUM CHLORIDE 20 MEQ
10 PACKET (EA) ORAL
Qty: 0 | Refills: 0 | Status: COMPLETED | OUTPATIENT
Start: 2019-03-24 | End: 2019-03-24

## 2019-03-24 RX ADMIN — Medication 100 MILLIEQUIVALENT(S): at 08:29

## 2019-03-24 RX ADMIN — POTASSIUM PHOSPHATE, MONOBASIC POTASSIUM PHOSPHATE, DIBASIC 83.33 MILLIMOLE(S): 236; 224 INJECTION, SOLUTION INTRAVENOUS at 22:17

## 2019-03-24 RX ADMIN — Medication 100 MILLIEQUIVALENT(S): at 16:40

## 2019-03-24 RX ADMIN — PANTOPRAZOLE SODIUM 40 MILLIGRAM(S): 20 TABLET, DELAYED RELEASE ORAL at 13:57

## 2019-03-24 RX ADMIN — PIPERACILLIN AND TAZOBACTAM 25 GRAM(S): 4; .5 INJECTION, POWDER, LYOPHILIZED, FOR SOLUTION INTRAVENOUS at 05:49

## 2019-03-24 RX ADMIN — Medication 100 MILLIEQUIVALENT(S): at 13:58

## 2019-03-24 RX ADMIN — Medication 100 MILLIEQUIVALENT(S): at 11:21

## 2019-03-24 RX ADMIN — ENOXAPARIN SODIUM 30 MILLIGRAM(S): 100 INJECTION SUBCUTANEOUS at 11:22

## 2019-03-24 RX ADMIN — Medication 100 MILLIEQUIVALENT(S): at 18:49

## 2019-03-24 RX ADMIN — PIPERACILLIN AND TAZOBACTAM 25 GRAM(S): 4; .5 INJECTION, POWDER, LYOPHILIZED, FOR SOLUTION INTRAVENOUS at 17:08

## 2019-03-24 NOTE — PROGRESS NOTE ADULT - SUBJECTIVE AND OBJECTIVE BOX
Surgery Progress Note    S: Patient seen and examined. No acute events overnight. Patient was seen by palliative care who discussed care with patient and family. - flatus, - BM, as reported by family and nursing staff. NGT put out 450mL during AM shift yesterday.  Will obtain abdominal XR this morning as it may be difficult for caretaker to ascertain the occurrence of flatus and to assess existing ileus.     O:  Vital Signs Last 24 Hrs  T(C): 37.4 (24 Mar 2019 00:53), Max: 37.4 (23 Mar 2019 08:50)  T(F): 99.4 (24 Mar 2019 00:53), Max: 99.4 (23 Mar 2019 08:50)  HR: 80 (24 Mar 2019 00:53) (80 - 84)  BP: 149/78 (24 Mar 2019 00:53) (103/63 - 149/78)  BP(mean): --  RR: 20 (24 Mar 2019 00:53) (19 - 20)  SpO2: 92% (24 Mar 2019 00:53) (90% - 94%)    03-22-19 @ 07:01  -  03-23-19 @ 07:00  --------------------------------------------------------  IN: 2800 mL / OUT: 1000 mL / NET: 1800 mL    03-23-19 @ 07:01  -  03-24-19 @ 01:54  --------------------------------------------------------  IN: 1250 mL / OUT: 450 mL / NET: 800 mL      MEDICATIONS  (STANDING):  dextrose 5% + sodium chloride 0.45% with potassium chloride 20 mEq/L 1000 milliLiter(s) (75 mL/Hr) IV Continuous <Continuous>  enoxaparin Injectable 30 milliGRAM(s) SubCutaneous daily  influenza   Vaccine 0.5 milliLiter(s) IntraMuscular once  pantoprazole  Injectable 40 milliGRAM(s) IV Push daily  piperacillin/tazobactam IVPB. 3.375 Gram(s) IV Intermittent every 12 hours  potassium chloride  10 mEq/100 mL IVPB 10 milliEquivalent(s) IV Intermittent every 1 hour    MEDICATIONS  (PRN):      LABS:                        11.9   12.52 )-----------( 193      ( 23 Mar 2019 09:40 )             37.0     03-23    146<H>  |  96  |  34<H>  ----------------------------<  58<L>  3.0<L>   |  34<H>  |  1.08    Ca    8.6      23 Mar 2019 07:26  Phos  1.9     03-23  Mg     2.2     03-23      Physical Exam:  Gen: Laying in bed, NAD, NGT to suction with bilious output  Resp: Unlabored breathing  Abd: soft, NT, mildly distended, no rebound or guarding  Ext: WWP  Skin: No rashes

## 2019-03-24 NOTE — PROGRESS NOTE ADULT - ASSESSMENT
89 yo woman with a hx of dementia (AOx1-2 at baseline), HTN, appendectomy, remote colon CA s/p right colectomy with ileocolic anastomosis in 2003 presents with N/V, admitted due to SBO with TP in the left pelvis. The patient is DNR/DNI, with the family wishing to keep her comfortable. Will manage the obstruction non-operatively with NGT decompression and bowel rest.     -NPO/IVF  -NGT to low continuous suction  -f/u AM abdominal XR  -pain medication only after exam  -monitor for GI fxn  -f/u AM labs  -lovenox for DVT ppx      ACS Surgery  [id number]

## 2019-03-25 LAB
ANION GAP SERPL CALC-SCNC: 9 MMOL/L — SIGNIFICANT CHANGE UP (ref 5–17)
APPEARANCE UR: CLEAR — SIGNIFICANT CHANGE UP
BILIRUB UR-MCNC: NEGATIVE — SIGNIFICANT CHANGE UP
BUN SERPL-MCNC: 10 MG/DL — SIGNIFICANT CHANGE UP (ref 7–23)
CALCIUM SERPL-MCNC: 8 MG/DL — LOW (ref 8.4–10.5)
CHLORIDE SERPL-SCNC: 105 MMOL/L — SIGNIFICANT CHANGE UP (ref 96–108)
CO2 SERPL-SCNC: 28 MMOL/L — SIGNIFICANT CHANGE UP (ref 22–31)
COLOR SPEC: SIGNIFICANT CHANGE UP
CREAT SERPL-MCNC: 0.8 MG/DL — SIGNIFICANT CHANGE UP (ref 0.5–1.3)
DIFF PNL FLD: SIGNIFICANT CHANGE UP
GLUCOSE SERPL-MCNC: 123 MG/DL — HIGH (ref 70–99)
GLUCOSE UR QL: NEGATIVE — SIGNIFICANT CHANGE UP
HCT VFR BLD CALC: 36 % — SIGNIFICANT CHANGE UP (ref 34.5–45)
HGB BLD-MCNC: 11.2 G/DL — LOW (ref 11.5–15.5)
KETONES UR-MCNC: NEGATIVE — SIGNIFICANT CHANGE UP
LEUKOCYTE ESTERASE UR-ACNC: NEGATIVE — SIGNIFICANT CHANGE UP
MAGNESIUM SERPL-MCNC: 1.7 MG/DL — SIGNIFICANT CHANGE UP (ref 1.6–2.6)
MCHC RBC-ENTMCNC: 28.9 PG — SIGNIFICANT CHANGE UP (ref 27–34)
MCHC RBC-ENTMCNC: 31.1 GM/DL — LOW (ref 32–36)
MCV RBC AUTO: 93 FL — SIGNIFICANT CHANGE UP (ref 80–100)
NITRITE UR-MCNC: NEGATIVE — SIGNIFICANT CHANGE UP
PH UR: 8.5 — HIGH (ref 5–8)
PHOSPHATE SERPL-MCNC: 3.6 MG/DL — SIGNIFICANT CHANGE UP (ref 2.5–4.5)
PLATELET # BLD AUTO: 191 K/UL — SIGNIFICANT CHANGE UP (ref 150–400)
POTASSIUM SERPL-MCNC: 4.1 MMOL/L — SIGNIFICANT CHANGE UP (ref 3.5–5.3)
POTASSIUM SERPL-SCNC: 4.1 MMOL/L — SIGNIFICANT CHANGE UP (ref 3.5–5.3)
PROT UR-MCNC: ABNORMAL
RBC # BLD: 3.87 M/UL — SIGNIFICANT CHANGE UP (ref 3.8–5.2)
RBC # FLD: 14.6 % — HIGH (ref 10.3–14.5)
SODIUM SERPL-SCNC: 142 MMOL/L — SIGNIFICANT CHANGE UP (ref 135–145)
SP GR SPEC: 1.02 — SIGNIFICANT CHANGE UP (ref 1.01–1.02)
UROBILINOGEN FLD QL: SIGNIFICANT CHANGE UP
WBC # BLD: 12.25 K/UL — HIGH (ref 3.8–10.5)
WBC # FLD AUTO: 12.25 K/UL — HIGH (ref 3.8–10.5)

## 2019-03-25 PROCEDURE — 71045 X-RAY EXAM CHEST 1 VIEW: CPT | Mod: 26

## 2019-03-25 PROCEDURE — 99232 SBSQ HOSP IP/OBS MODERATE 35: CPT

## 2019-03-25 RX ORDER — MAGNESIUM SULFATE 500 MG/ML
2 VIAL (ML) INJECTION ONCE
Qty: 0 | Refills: 0 | Status: COMPLETED | OUTPATIENT
Start: 2019-03-25 | End: 2019-03-25

## 2019-03-25 RX ORDER — ACETAMINOPHEN 500 MG
650 TABLET ORAL ONCE
Qty: 0 | Refills: 0 | Status: DISCONTINUED | OUTPATIENT
Start: 2019-03-25 | End: 2019-03-26

## 2019-03-25 RX ORDER — ACETAMINOPHEN 500 MG
650 TABLET ORAL ONCE
Qty: 0 | Refills: 0 | Status: COMPLETED | OUTPATIENT
Start: 2019-03-25 | End: 2019-03-25

## 2019-03-25 RX ADMIN — PANTOPRAZOLE SODIUM 40 MILLIGRAM(S): 20 TABLET, DELAYED RELEASE ORAL at 12:14

## 2019-03-25 RX ADMIN — Medication 650 MILLIGRAM(S): at 03:00

## 2019-03-25 RX ADMIN — PIPERACILLIN AND TAZOBACTAM 25 GRAM(S): 4; .5 INJECTION, POWDER, LYOPHILIZED, FOR SOLUTION INTRAVENOUS at 17:51

## 2019-03-25 RX ADMIN — PIPERACILLIN AND TAZOBACTAM 25 GRAM(S): 4; .5 INJECTION, POWDER, LYOPHILIZED, FOR SOLUTION INTRAVENOUS at 05:43

## 2019-03-25 RX ADMIN — Medication 260 MILLIGRAM(S): at 02:47

## 2019-03-25 RX ADMIN — ENOXAPARIN SODIUM 30 MILLIGRAM(S): 100 INJECTION SUBCUTANEOUS at 12:14

## 2019-03-25 RX ADMIN — Medication 50 GRAM(S): at 10:52

## 2019-03-25 NOTE — PROVIDER CONTACT NOTE (OTHER) - ASSESSMENT
patient is unresponsive  was admitted for emesisx3 Dx SBO had  NGT D/C since Febrile 102.6  VS remained stable, skin's color pink but warm to touch

## 2019-03-25 NOTE — DIETITIAN INITIAL EVALUATION ADULT. - PHYSICAL APPEARANCE
underweight/Nutrition Focused Physical Assessment performed with consent from pt's daughter: pt noted with moderate muscle wasting of temples, clavicle, deltoid, interosseous muscle; moderate fat depletion of orbital region, triceps/biceps.

## 2019-03-25 NOTE — DIETITIAN INITIAL EVALUATION ADULT. - ENERGY NEEDS
Ht: 62 Wt: 88 pounds BMI: 16.1 kg/m2 IBW: 110 pounds(+/-10%)   No edema. No pressure ulcers documented.

## 2019-03-25 NOTE — DIETITIAN INITIAL EVALUATION ADULT. - OTHER INFO
Pt seen for inadequate diet day #4. Per chart, pt admitted with SBO being treated non-operatively with NGT decompression and bowel rest. NGT was removed yesterday, pt's diet advanced to clears today, pureed with nectar consistency fluid. Per pt's aide pt able to tolerate tea and juice. Pt's aide requesting Ensure supplement when diet advanced. Per pt's aide pt has lower dentures but does not wear them. No history of swallowing difficulty.

## 2019-03-25 NOTE — PROGRESS NOTE ADULT - ASSESSMENT
89 yo woman with a hx of dementia (AOx1-2 at baseline), HTN, appendectomy, remote colon CA s/p right colectomy with ileocolic anastomosis in 2003 presents with N/V, admitted due to SBO with TP in the left pelvis. The patient is DNR/DNI, with the family wishing to keep her comfortable. Will manage the obstruction non-operatively with NGT decompression and bowel rest.     -NPO/IVF  -pain medication only after exam  -f/u palliative  -monitor for GI fxn  -f/u AM labs  -lovenox for DVT ppx    ACS Surgery  [id number]

## 2019-03-25 NOTE — CHART NOTE - NSCHARTNOTEFT_GEN_A_CORE
Notified by RN that patient has a temperature of  102.6 orally. Patient seen and evaluated at the bedside. Patient has been nonverbal per bedside aide since hospitalization and has been sleeping all the time. Patient unarousable, mouth open and moving tongue to wet lips.  Unable to assess ROS     T(C): 38.1 (03-25-19 @ 01:40), Max: 39.2 (03-25-19 @ 01:39)  HR: 81 (03-25-19 @ 01:39) (72 - 88)  BP: 131/75 (03-25-19 @ 01:39) (131/70 - 143/87)  RR: 20 (03-25-19 @ 01:39) (19 - 20)  SpO2: 91% (03-25-19 @ 01:39) (91% - 96%)  Wt(kg): --                        12.6   13.53 )-----------( 203      ( 24 Mar 2019 10:03 )             39.2     03-24    x   |  x   |  x   ----------------------------<  x   3.6   |  x   |  x     Ca    8.5      24 Mar 2019 07:17  Phos  1.7     03-24  Mg     2.0     03-24      Physical Exam  General: A&O x 0, nonverbal, sleeping, unarousable, moving tongue to wet lips and cleared her throat   Pulmonary: nonlabored breathing   Abdomen: soft, nontender, nondistended,   Extremities: no edema  Skin: no rash, dry     MEDICATIONS  (STANDING):  acetaminophen  IVPB .. 650 milliGRAM(s) IV Intermittent once  dextrose 5% + sodium chloride 0.45% with potassium chloride 20 mEq/L 1000 milliLiter(s) (75 mL/Hr) IV Continuous <Continuous>  enoxaparin Injectable 30 milliGRAM(s) SubCutaneous daily  influenza   Vaccine 0.5 milliLiter(s) IntraMuscular once  pantoprazole  Injectable 40 milliGRAM(s) IV Push daily  piperacillin/tazobactam IVPB. 3.375 Gram(s) IV Intermittent every 12 hours    A/P  89 yo woman with a hx of dementia (AOx1-2 at baseline), HTN, appendectomy, remote colon CA s/p right colectomy with ileocolic anastomosis in 2003 presents with N/V, admitted due to SBO with TP in the left pelvis. The patient desaturated in the ED prior to NGT placement, with The patient is DNR/DNI, with the family wishing to keep her comfortable. Will manage the obstruction non-operatively with NGT decompression and bowel rest. Patient also with JALEEL (Cr 1.26, baseline 0.64). Patient now with a fever of 102. 6      1) Continue to monitor vital signs. Recheck temperature in 1-2 hours.   2) IV Tylenol given for fever  3) Blood culture x 2,  urinalysis and chest x-ray ordered. Follow up with results./ Follow up with blood culture x 2 and UA.   4) Continue with IV hydration   5) Cooling measures- ice packs   6) Continue with above antimicrobial management- Zosyn.   7) Follow up with day time team in the morning.     Violet Hood PA-C   [id number] [facility]

## 2019-03-25 NOTE — DIETITIAN INITIAL EVALUATION ADULT. - SOURCE
family/significant other/other (specify)/medical record, pt's aide at bedside and pt's daughter at bedside

## 2019-03-25 NOTE — DIETITIAN INITIAL EVALUATION ADULT. - ORAL INTAKE PTA
Per pt's aide, pt with very poor  (minimal to none) PO intake for one week PTA. Prior to that pt with very good appetite and PO intake, consuming 3 meals daily with snacks and has 1 Ensure daily. Pt does not take any micronutrient supplementation PTA. NKFA.

## 2019-03-25 NOTE — DIETITIAN INITIAL EVALUATION ADULT. - NS AS NUTRI INTERV MEALS SNACK
Continue with clear liquids as tolerated, medical team to advance diet as feasible- texture/consistency per team

## 2019-03-25 NOTE — CHART NOTE - NSCHARTNOTEFT_GEN_A_CORE
Pt seen and examined  No apparent distress  NG removed  Son Gil is by the bedside  She is sitting upright in a chair, HHA present  There will be an attempt at feeding today  Monitor clinical course over 24-48 hours, which will likely shape further conversations about the forthcoming care plan

## 2019-03-25 NOTE — PROVIDER CONTACT NOTE (OTHER) - BACKGROUND
patient is unresponsive arousable to tactile stimuli was admitted for emesisx3 Dx SBO had  NGT that was D/C since now with temp 102.6 oral

## 2019-03-25 NOTE — CHART NOTE - NSCHARTNOTEFT_GEN_A_CORE
Upon Nutritional Assessment by the Registered Dietitian your patient was determined to meet criteria / has evidence of the following diagnosis/diagnoses:          [ ]  Mild Protein Calorie Malnutrition        [ ]  Moderate Protein Calorie Malnutrition        [X ] Severe Protein Calorie Malnutrition        [ ] Unspecified Protein Calorie Malnutrition        [ ] Underweight / BMI <19        [ ] Morbid Obesity / BMI > 40      Findings as based on:  [X ] Comprehensive nutrition assessment   [X ] Nutrition Focused Physical Exam:  pt noted with moderate muscle wasting of temples, clavicle, deltoid, interosseous muscle; moderate fat depletion of orbital region, triceps/biceps  [ X] Other: Pt with 5% weight loss x 1 week      Nutrition Plan/Recommendations:      1)  Continue with clear liquids as tolerated, medical team to advance diet as feasible- texture/consistency per team  2) Recommend Ensure Enlive- 2 per day (provides additional 700cal, 40gm protein) when diet advanced  3) RD to follow up for further nutritional interventions as indicated/requested by medical team/pt.  Clara Couch RD pager #[phone]       PROVIDER Section:     By signing this assessment you are acknowledging and agree with the diagnosis/diagnoses assigned by the Registered Dietitian    Comments:

## 2019-03-26 LAB
ANION GAP SERPL CALC-SCNC: 12 MMOL/L — SIGNIFICANT CHANGE UP (ref 5–17)
BUN SERPL-MCNC: 8 MG/DL — SIGNIFICANT CHANGE UP (ref 7–23)
CALCIUM SERPL-MCNC: 8 MG/DL — LOW (ref 8.4–10.5)
CHLORIDE SERPL-SCNC: 104 MMOL/L — SIGNIFICANT CHANGE UP (ref 96–108)
CO2 SERPL-SCNC: 24 MMOL/L — SIGNIFICANT CHANGE UP (ref 22–31)
CREAT SERPL-MCNC: 0.74 MG/DL — SIGNIFICANT CHANGE UP (ref 0.5–1.3)
GLUCOSE SERPL-MCNC: 98 MG/DL — SIGNIFICANT CHANGE UP (ref 70–99)
HCT VFR BLD CALC: 34.6 % — SIGNIFICANT CHANGE UP (ref 34.5–45)
HGB BLD-MCNC: 11.1 G/DL — LOW (ref 11.5–15.5)
MAGNESIUM SERPL-MCNC: 2 MG/DL — SIGNIFICANT CHANGE UP (ref 1.6–2.6)
MCHC RBC-ENTMCNC: 29.7 PG — SIGNIFICANT CHANGE UP (ref 27–34)
MCHC RBC-ENTMCNC: 32.1 GM/DL — SIGNIFICANT CHANGE UP (ref 32–36)
MCV RBC AUTO: 92.5 FL — SIGNIFICANT CHANGE UP (ref 80–100)
PHOSPHATE SERPL-MCNC: 2 MG/DL — LOW (ref 2.5–4.5)
PLATELET # BLD AUTO: 186 K/UL — SIGNIFICANT CHANGE UP (ref 150–400)
POTASSIUM SERPL-MCNC: 4.2 MMOL/L — SIGNIFICANT CHANGE UP (ref 3.5–5.3)
POTASSIUM SERPL-SCNC: 4.2 MMOL/L — SIGNIFICANT CHANGE UP (ref 3.5–5.3)
RBC # BLD: 3.74 M/UL — LOW (ref 3.8–5.2)
RBC # FLD: 14.6 % — HIGH (ref 10.3–14.5)
SODIUM SERPL-SCNC: 140 MMOL/L — SIGNIFICANT CHANGE UP (ref 135–145)
WBC # BLD: 12.15 K/UL — HIGH (ref 3.8–10.5)
WBC # FLD AUTO: 12.15 K/UL — HIGH (ref 3.8–10.5)

## 2019-03-26 PROCEDURE — 99232 SBSQ HOSP IP/OBS MODERATE 35: CPT

## 2019-03-26 RX ADMIN — Medication 85 MILLIMOLE(S): at 11:16

## 2019-03-26 RX ADMIN — PIPERACILLIN AND TAZOBACTAM 25 GRAM(S): 4; .5 INJECTION, POWDER, LYOPHILIZED, FOR SOLUTION INTRAVENOUS at 07:38

## 2019-03-26 RX ADMIN — ENOXAPARIN SODIUM 30 MILLIGRAM(S): 100 INJECTION SUBCUTANEOUS at 11:17

## 2019-03-26 RX ADMIN — PIPERACILLIN AND TAZOBACTAM 25 GRAM(S): 4; .5 INJECTION, POWDER, LYOPHILIZED, FOR SOLUTION INTRAVENOUS at 17:19

## 2019-03-26 RX ADMIN — PANTOPRAZOLE SODIUM 40 MILLIGRAM(S): 20 TABLET, DELAYED RELEASE ORAL at 11:17

## 2019-03-26 NOTE — PROGRESS NOTE ADULT - ASSESSMENT
89 yo woman with a hx of dementia (AOx1-2 at baseline), HTN, appendectomy, remote colon CA s/p right colectomy with ileocolic anastomosis in 2003 presents with N/V, admitted due to SBO with TP in the left pelvis. The patient is DNR/DNI, with the family wishing to keep her comfortable. Will manage the obstruction non-operatively with NGT decompression and bowel rest.     -c/w CLD  -pain medication only after exam  -f/u palliative  -f/u fever w/u  -monitor for GI fxn  -f/u AM labs  -lovenox for DVT ppx    ACS Surgery  [id number]

## 2019-03-26 NOTE — PROGRESS NOTE ADULT - SUBJECTIVE AND OBJECTIVE BOX
Surgery Progress Note    S: Patient seen and examined. Febrile o/n. Patient advanced to CLD yesterday and tolerated well with help of aide. Patient has not had BM and unclear if passing flatus.     O:  Vital Signs Last 24 Hrs  T(C): 37.8 (26 Mar 2019 09:10), Max: 38.4 (25 Mar 2019 21:29)  T(F): 100.1 (26 Mar 2019 09:10), Max: 101.2 (25 Mar 2019 21:29)  HR: 82 (26 Mar 2019 09:10) (80 - 91)  BP: 146/85 (26 Mar 2019 09:10) (123/75 - 151/90)  BP(mean): --  RR: 20 (26 Mar 2019 09:10) (20 - 20)  SpO2: 95% (26 Mar 2019 09:10) (91% - 98%)    I&O's Detail    25 Mar 2019 07:01  -  26 Mar 2019 07:00  --------------------------------------------------------  IN:    dextrose 5% + sodium chloride 0.45% with potassium chloride 20 mEq/L: 900 mL    Oral Fluid: 600 mL    Solution: 50 mL  Total IN: 1550 mL    OUT:    Voided: 100 mL  Total OUT: 100 mL    Total NET: 1450 mL      26 Mar 2019 07:01  -  26 Mar 2019 10:11  --------------------------------------------------------  IN:    Oral Fluid: 120 mL  Total IN: 120 mL    OUT:  Total OUT: 0 mL    Total NET: 120 mL          MEDICATIONS  (STANDING):  acetaminophen  IVPB .. 650 milliGRAM(s) IV Intermittent once  dextrose 5% + sodium chloride 0.45% with potassium chloride 20 mEq/L 1000 milliLiter(s) (75 mL/Hr) IV Continuous <Continuous>  enoxaparin Injectable 30 milliGRAM(s) SubCutaneous daily  influenza   Vaccine 0.5 milliLiter(s) IntraMuscular once  pantoprazole  Injectable 40 milliGRAM(s) IV Push daily  piperacillin/tazobactam IVPB. 3.375 Gram(s) IV Intermittent every 12 hours  sodium phosphate IVPB 30 milliMole(s) IV Intermittent once    MEDICATIONS  (PRN):                            11.1   12.15 )-----------( 186      ( 26 Mar 2019 08:43 )             34.6       03-26    140  |  104  |  8   ----------------------------<  98  4.2   |  24  |  0.74    Ca    8.0<L>      26 Mar 2019 07:19  Phos  2.0     03-26  Mg     2.0     03-26        Physical Exam:  Gen: Laying in bed, NAD  Resp: Unlabored breathing via nasal canula	  Abd: soft, NT, mildly distended, no rebound or guarding  Ext: WWP  Skin: No rashes

## 2019-03-26 NOTE — INPATIENT CERTIFICATION FOR MEDICARE PATIENTS - PHYSICIAN CONCUR
I concur with the Admission Order and I certify that services are provided in accordance with Section 42 CFR § 412.3
all other ROS negative except as per HPI

## 2019-03-27 ENCOUNTER — TRANSCRIPTION ENCOUNTER (OUTPATIENT)
Age: 84
End: 2019-03-27

## 2019-03-27 LAB
ANION GAP SERPL CALC-SCNC: 12 MMOL/L — SIGNIFICANT CHANGE UP (ref 5–17)
BUN SERPL-MCNC: 5 MG/DL — LOW (ref 7–23)
CALCIUM SERPL-MCNC: 7.7 MG/DL — LOW (ref 8.4–10.5)
CHLORIDE SERPL-SCNC: 99 MMOL/L — SIGNIFICANT CHANGE UP (ref 96–108)
CO2 SERPL-SCNC: 23 MMOL/L — SIGNIFICANT CHANGE UP (ref 22–31)
CREAT SERPL-MCNC: 0.57 MG/DL — SIGNIFICANT CHANGE UP (ref 0.5–1.3)
GLUCOSE SERPL-MCNC: 110 MG/DL — HIGH (ref 70–99)
HCT VFR BLD CALC: 34.4 % — LOW (ref 34.5–45)
HGB BLD-MCNC: 11.3 G/DL — LOW (ref 11.5–15.5)
MAGNESIUM SERPL-MCNC: 1.7 MG/DL — SIGNIFICANT CHANGE UP (ref 1.6–2.6)
MCHC RBC-ENTMCNC: 29.4 PG — SIGNIFICANT CHANGE UP (ref 27–34)
MCHC RBC-ENTMCNC: 32.8 GM/DL — SIGNIFICANT CHANGE UP (ref 32–36)
MCV RBC AUTO: 89.6 FL — SIGNIFICANT CHANGE UP (ref 80–100)
PHOSPHATE SERPL-MCNC: 2.2 MG/DL — LOW (ref 2.5–4.5)
PLATELET # BLD AUTO: 226 K/UL — SIGNIFICANT CHANGE UP (ref 150–400)
POTASSIUM SERPL-MCNC: 4.3 MMOL/L — SIGNIFICANT CHANGE UP (ref 3.5–5.3)
POTASSIUM SERPL-SCNC: 4.3 MMOL/L — SIGNIFICANT CHANGE UP (ref 3.5–5.3)
RBC # BLD: 3.84 M/UL — SIGNIFICANT CHANGE UP (ref 3.8–5.2)
RBC # FLD: 14.3 % — SIGNIFICANT CHANGE UP (ref 10.3–14.5)
SODIUM SERPL-SCNC: 134 MMOL/L — LOW (ref 135–145)
WBC # BLD: 12.4 K/UL — HIGH (ref 3.8–10.5)
WBC # FLD AUTO: 12.4 K/UL — HIGH (ref 3.8–10.5)

## 2019-03-27 PROCEDURE — 99232 SBSQ HOSP IP/OBS MODERATE 35: CPT

## 2019-03-27 RX ORDER — MAGNESIUM SULFATE 500 MG/ML
2 VIAL (ML) INJECTION ONCE
Qty: 0 | Refills: 0 | Status: COMPLETED | OUTPATIENT
Start: 2019-03-27 | End: 2019-03-27

## 2019-03-27 RX ORDER — PANTOPRAZOLE SODIUM 20 MG/1
40 TABLET, DELAYED RELEASE ORAL
Qty: 0 | Refills: 0 | Status: DISCONTINUED | OUTPATIENT
Start: 2019-03-27 | End: 2019-03-28

## 2019-03-27 RX ADMIN — Medication 85 MILLIMOLE(S): at 09:40

## 2019-03-27 RX ADMIN — ENOXAPARIN SODIUM 30 MILLIGRAM(S): 100 INJECTION SUBCUTANEOUS at 13:31

## 2019-03-27 RX ADMIN — PIPERACILLIN AND TAZOBACTAM 25 GRAM(S): 4; .5 INJECTION, POWDER, LYOPHILIZED, FOR SOLUTION INTRAVENOUS at 17:12

## 2019-03-27 RX ADMIN — DEXTROSE MONOHYDRATE, SODIUM CHLORIDE, AND POTASSIUM CHLORIDE 75 MILLILITER(S): 50; .745; 4.5 INJECTION, SOLUTION INTRAVENOUS at 05:15

## 2019-03-27 RX ADMIN — Medication 50 GRAM(S): at 08:07

## 2019-03-27 RX ADMIN — PIPERACILLIN AND TAZOBACTAM 25 GRAM(S): 4; .5 INJECTION, POWDER, LYOPHILIZED, FOR SOLUTION INTRAVENOUS at 05:15

## 2019-03-27 NOTE — PROGRESS NOTE ADULT - SUBJECTIVE AND OBJECTIVE BOX
Surgery Progress Note    S: Patient seen and examined. No acute events overnight. Patient was advanced to soft diet yesterday and is tolerating well. + BM. Plan for hospice discussion today.     O:  Vital Signs Last 24 Hrs  T(C): 37.6 (27 Mar 2019 05:25), Max: 37.9 (26 Mar 2019 13:45)  T(F): 99.7 (27 Mar 2019 05:25), Max: 100.2 (26 Mar 2019 13:45)  HR: 88 (27 Mar 2019 05:25) (82 - 93)  BP: 132/73 (27 Mar 2019 05:25) (132/73 - 159/84)  BP(mean): --  RR: 20 (27 Mar 2019 05:25) (20 - 20)  SpO2: 95% (27 Mar 2019 05:25) (93% - 96%)    I&O's Detail    26 Mar 2019 07:01  -  27 Mar 2019 07:00  --------------------------------------------------------  IN:    dextrose 5% + sodium chloride 0.45% with potassium chloride 20 mEq/L: 600 mL    IV PiggyBack: 700 mL    Oral Fluid: 360 mL  Total IN: 1660 mL    OUT:    Stool: 1 mL    Voided: 700 mL  Total OUT: 701 mL    Total NET: 959 mL          MEDICATIONS  (STANDING):  enoxaparin Injectable 30 milliGRAM(s) SubCutaneous daily  influenza   Vaccine 0.5 milliLiter(s) IntraMuscular once  pantoprazole    Tablet 40 milliGRAM(s) Oral before breakfast  piperacillin/tazobactam IVPB. 3.375 Gram(s) IV Intermittent every 12 hours    MEDICATIONS  (PRN):                            11.1   12.15 )-----------( 186      ( 26 Mar 2019 08:43 )             34.6       03-26    140  |  104  |  8   ----------------------------<  98  4.2   |  24  |  0.74    Ca    8.0<L>      26 Mar 2019 07:19  Phos  2.0     03-26  Mg     2.0     03-26        Physical Exam:  Gen: Laying in bed, NAD  Resp: Unlabored breathing via nasal canula  Abd: soft, NT, mildly distended, no rebound or guarding  Ext: WWP  Skin: No rashes

## 2019-03-27 NOTE — DISCHARGE NOTE PROVIDER - CARE PROVIDER_API CALL
Eldon Grande)  Surgery; Surgical Critical Care  [address] [id number]  Phone: [phone]  Fax: [phone]  Follow Up Time:

## 2019-03-27 NOTE — DISCHARGE NOTE PROVIDER - HOSPITAL COURSE
89 yo woman with a hx of dementia (AOx1-2 at baseline), HTN, appendectomy, remote colon CA s/p colectomy in 2003 presents with N/V x5 days beginning Monday. Family reports the patient has not been able to tolerate PO intake since that time, and initially believed the patient had a "stomach flu." The patient was evaluated by her home health doctor who ordered a KUB demonstrating dilated bowel and thus instructed family to bring the patient to the ED.        In the ED, the patient desaturated prior to NGT placement. NGT placement produced >1L of gastric contents. The patient was placed on supplemental oxygen and with saturations in the high 80s.  She was started on Zosyn for aspiration.  Palliative was consulted for goals of care, she was made DNR/DNI.  When she had return of GI function, NGT was removed and she was started on a PO diet.  Diet was advanced as tolerated. 89 yo woman with a hx of dementia (AOx1-2 at baseline), HTN, appendectomy, remote colon CA s/p colectomy in 2003 presents with N/V x5 days beginning Monday. Family reports the patient has not been able to tolerate PO intake since that time, and initially believed the patient had a "stomach flu." The patient was evaluated by her home health doctor who ordered a KUB demonstrating dilated bowel and thus instructed family to bring the patient to the ED.        In the ED, the patient desaturated prior to NGT placement. NGT placement produced >1L of gastric contents. The patient was placed on supplemental oxygen and with saturations in the high 80s.  She was started on Zosyn for aspiration.  Palliative was consulted for goals of care, she was made DNR/DNI.  When she had return of GI function, NGT was removed and she was started on a PO diet.  Diet was advanced as tolerated.  Hospice was consulted per family request and dispo is home with hospice services. 89 yo woman with a hx of dementia (AOx1-2 at baseline), HTN, appendectomy, remote colon CA s/p colectomy in 2003 presents with N/V x5 days beginning Monday. Family reports the patient has not been able to tolerate PO intake since that time, and initially believed the patient had a "stomach flu." The patient was evaluated by her home health doctor who ordered a KUB demonstrating dilated bowel and thus instructed family to bring the patient to the ED.        In the ED, the patient desaturated prior to NGT placement. NGT placement produced >1L of gastric contents. The patient was placed on supplemental oxygen and with saturations in the high 80s.  She was started on Zosyn for aspiration.  Palliative was consulted for goals of care, she was made DNR/DNI.  When she had return of GI function, NGT was removed and she was started on a PO diet.  Diet was advanced as tolerated.  Hospice was consulted per family request and dispo is home with hospice services.    She will complete a course of oral antibiotics for aspiration pneumonia.

## 2019-03-27 NOTE — PROGRESS NOTE ADULT - ASSESSMENT
89 yo woman with a hx of dementia (AOx1-2 at baseline), HTN, appendectomy, remote colon CA s/p right colectomy with ileocolic anastomosis in 2003 presents with N/V, admitted due to SBO with TP in the left pelvis. The patient is DNR/DNI, with the family wishing to keep her comfortable. Will manage the obstruction non-operatively with NGT decompression and bowel rest.     -c/w dysphagia 2  -pain medication only after exam  -f/u palliative/hospice discussion  -f/u fever w/u, no fevers o/n  -monitor for GI fxn  -f/u AM labs  -lovenox for DVT ppx    ACS Surgery  [phone]

## 2019-03-27 NOTE — GOALS OF CARE CONVERSATION - PERSONAL ADVANCE DIRECTIVE - CONVERSATION DETAILS
Met w/ the pt's dtr Soila Ang and son Reji Tripp, hospice services discussed at length. Family is in agreement w/ home hospice services when the pt is medically cleared for d/c. Hospice consents signed; DME (02, suction, bed, air mattress) will be delivered 3/28/19. Thank you.
Called pt's son to arrange to meet to discuss hospice care, appointment made for 11 a.m. 3/27/19.

## 2019-03-27 NOTE — DISCHARGE NOTE PROVIDER - NSDCCPCAREPLAN_GEN_ALL_CORE_FT
PRINCIPAL DISCHARGE DIAGNOSIS  Diagnosis: SBO (small bowel obstruction)  Assessment and Plan of Treatment: 1. Resume a regular diet  2.  Activity as tolerated  3.  No follow-up is required.  If you have any questions or concerns, please call Dr. Grande's office at [phone]

## 2019-03-28 ENCOUNTER — TRANSCRIPTION ENCOUNTER (OUTPATIENT)
Age: 84
End: 2019-03-28

## 2019-03-28 VITALS
HEART RATE: 89 BPM | DIASTOLIC BLOOD PRESSURE: 80 MMHG | TEMPERATURE: 99 F | SYSTOLIC BLOOD PRESSURE: 128 MMHG | RESPIRATION RATE: 18 BRPM | OXYGEN SATURATION: 94 %

## 2019-03-28 PROCEDURE — 87040 BLOOD CULTURE FOR BACTERIA: CPT

## 2019-03-28 PROCEDURE — 99232 SBSQ HOSP IP/OBS MODERATE 35: CPT

## 2019-03-28 PROCEDURE — 84100 ASSAY OF PHOSPHORUS: CPT

## 2019-03-28 PROCEDURE — 85610 PROTHROMBIN TIME: CPT

## 2019-03-28 PROCEDURE — 74177 CT ABD & PELVIS W/CONTRAST: CPT

## 2019-03-28 PROCEDURE — 83735 ASSAY OF MAGNESIUM: CPT

## 2019-03-28 PROCEDURE — 83605 ASSAY OF LACTIC ACID: CPT

## 2019-03-28 PROCEDURE — 96361 HYDRATE IV INFUSION ADD-ON: CPT

## 2019-03-28 PROCEDURE — 86850 RBC ANTIBODY SCREEN: CPT

## 2019-03-28 PROCEDURE — 84132 ASSAY OF SERUM POTASSIUM: CPT

## 2019-03-28 PROCEDURE — 81001 URINALYSIS AUTO W/SCOPE: CPT

## 2019-03-28 PROCEDURE — 99285 EMERGENCY DEPT VISIT HI MDM: CPT | Mod: 25

## 2019-03-28 PROCEDURE — 85014 HEMATOCRIT: CPT

## 2019-03-28 PROCEDURE — 82947 ASSAY GLUCOSE BLOOD QUANT: CPT

## 2019-03-28 PROCEDURE — 80048 BASIC METABOLIC PNL TOTAL CA: CPT

## 2019-03-28 PROCEDURE — 96365 THER/PROPH/DIAG IV INF INIT: CPT | Mod: XU

## 2019-03-28 PROCEDURE — 85027 COMPLETE CBC AUTOMATED: CPT

## 2019-03-28 PROCEDURE — 74018 RADEX ABDOMEN 1 VIEW: CPT

## 2019-03-28 PROCEDURE — 71045 X-RAY EXAM CHEST 1 VIEW: CPT

## 2019-03-28 PROCEDURE — 96375 TX/PRO/DX INJ NEW DRUG ADDON: CPT | Mod: XU

## 2019-03-28 PROCEDURE — 82330 ASSAY OF CALCIUM: CPT

## 2019-03-28 PROCEDURE — 80053 COMPREHEN METABOLIC PANEL: CPT

## 2019-03-28 PROCEDURE — 84295 ASSAY OF SERUM SODIUM: CPT

## 2019-03-28 PROCEDURE — 82803 BLOOD GASES ANY COMBINATION: CPT

## 2019-03-28 PROCEDURE — 85730 THROMBOPLASTIN TIME PARTIAL: CPT

## 2019-03-28 PROCEDURE — 86900 BLOOD TYPING SEROLOGIC ABO: CPT

## 2019-03-28 PROCEDURE — 86901 BLOOD TYPING SEROLOGIC RH(D): CPT

## 2019-03-28 PROCEDURE — 82435 ASSAY OF BLOOD CHLORIDE: CPT

## 2019-03-28 RX ORDER — PANTOPRAZOLE SODIUM 20 MG/1
1 TABLET, DELAYED RELEASE ORAL
Qty: 30 | Refills: 0 | OUTPATIENT
Start: 2019-03-28

## 2019-03-28 RX ADMIN — PIPERACILLIN AND TAZOBACTAM 25 GRAM(S): 4; .5 INJECTION, POWDER, LYOPHILIZED, FOR SOLUTION INTRAVENOUS at 06:23

## 2019-03-28 NOTE — PROVIDER CONTACT NOTE (OTHER) - ACTION/TREATMENT ORDERED:
Cold pack applied ,Tylenol 650mg Iv given Blood culture x1 sent CxR completed
MD aware, will come to unit to assess.  Will continue to monitor Pt status

## 2019-03-28 NOTE — DISCHARGE NOTE NURSING/CASE MANAGEMENT/SOCIAL WORK - NSDCDPATPORTLINK_GEN_ALL_CORE
You can access the [facility] Patient Portal, offered by [facility], by registering with the following website: [email or web address]

## 2019-03-28 NOTE — PROGRESS NOTE ADULT - SUBJECTIVE AND OBJECTIVE BOX
Surgery Progress Note    S: Patient seen and examined. No acute events overnight. + flatus, + BM. patient's family agreed to home hospice yesterday. supplies to be delivered to home today.     O:  Vital Signs Last 24 Hrs  T(C): 37.4 (28 Mar 2019 05:24), Max: 37.9 (27 Mar 2019 21:22)  T(F): 99.3 (28 Mar 2019 05:24), Max: 100.2 (27 Mar 2019 21:22)  HR: 86 (28 Mar 2019 05:24) (76 - 88)  BP: 147/71 (28 Mar 2019 05:24) (115/70 - 147/71)  BP(mean): --  RR: 20 (28 Mar 2019 05:24) (20 - 20)  SpO2: 96% (28 Mar 2019 05:24) (91% - 98%)    I&O's Detail    27 Mar 2019 07:01  -  28 Mar 2019 07:00  --------------------------------------------------------  IN:    Oral Fluid: 580 mL  Total IN: 580 mL    OUT:    Stool: 3 mL    Voided: 1075 mL  Total OUT: 1078 mL    Total NET: -498 mL          MEDICATIONS  (STANDING):  enoxaparin Injectable 30 milliGRAM(s) SubCutaneous daily  influenza   Vaccine 0.5 milliLiter(s) IntraMuscular once  pantoprazole    Tablet 40 milliGRAM(s) Oral before breakfast  piperacillin/tazobactam IVPB. 3.375 Gram(s) IV Intermittent every 12 hours    MEDICATIONS  (PRN):                            11.3   12.40 )-----------( 226      ( 27 Mar 2019 09:55 )             34.4       03-27    134<L>  |  99  |  5<L>  ----------------------------<  110<H>  4.3   |  23  |  0.57    Ca    7.7<L>      27 Mar 2019 06:57  Phos  2.2     03-27  Mg     1.7     03-27        Physical Exam:  Gen: Laying in bed, NAD  Resp: Unlabored breathing via nasal canula  Abd: soft, NTND, no rebound or guarding  Ext: WWP  Skin: No rashes

## 2019-03-28 NOTE — PROGRESS NOTE ADULT - ATTENDING COMMENTS
seen and examined 03-25-19 @ 1130    NG removed yesterday  NPO  no nausea or vomiting  no BM    Tm 102.6  AVSS  RR = 20  SpO2 = 90% on 3 lpm nasal oxygen    no respiratory distress  soft / NT / ND  normoactive bowel sounds    WBC = 12  BCx (3/25 x 2) - pending    SBO  -start dysphagia diet (aid reports that she frequently coughs when eating which suggests chronic aspiration)    aspiration pneumonia  -continue Zosyn
seen and examined 03-26-19 @ 0934    tolerating dysphagia diet  no nausea or vomiting  no BM    Tm 101.2  AVSS  RR = 20  SpO2 = 93% on 4 lpm nasal oxygen    no respiratory distress  soft / NT / ND    WBC = 12  BCx (3/25 x 2) - NGTD    SBO seems resolved    aspiration pneumonia  -continue Zosyn
seen and examined 03-27-19 @ 1415    tolerating dysphagia diet  no nausea or vomiting  +BM    Tm 100.2  AVSS  RR = 20  SpO2 = 92% on 4 lpm nasal oxygen    no respiratory distress  soft / NT / ND    WBC = 12  BCx (3/25 x 2) - NGTD    SBO resolved    aspiration pneumonia  -continue Zosyn    -D/C home tomorrow with home hospice
seen and examined 03-28-19 @ 0910    tolerating dysphagia diet  no nausea or vomiting  +BM    Tm 100.2  SpO2 = 92% on 4 lpm nasal oxygen    no respiratory distress  soft / NT / ND    BCx (3/25 x 2) - NGTD    SBO resolved    aspiration pneumonia  -completing 7 day empiric course of Zosyn today    -D/C home tomorrow with home hospice
somnolent  frail  NGT flushed and functioning  hard to know if bowel function  will get abdominal x ray in the morning  DNR and DNI status
resting comfortable  NGT in place  getting palliative consult

## 2019-03-28 NOTE — CHART NOTE - NSCHARTNOTEFT_GEN_A_CORE
Nutrition Follow Up Note      Source: medical record, pt's aide at bedside     Diet : Dysphagia 2, Nectar Consistency Fluid    Patient seen for malnutrition follow up. Medical chart reviewed/events noted. Per chart pt presents with N/V, admitted due to SBO being treated non-operatively. The patient is DNR/DNI. Plan for pt to be discharged on home hospice. Per pt's aide, pt has been eating fair (<50% of meals). Pt with no GI distress at this time, + BM today. Pt's aide declined to have any nutrition-related questions/concerns at this time. Pt's aide made aware RD remains available.       Daily Weight in k (03-27), Weight in k.9 (03-25)- large fluctuation noted likely scale error as pt's UBW per pt's daughter 42.1Kg      Pertinent Medications: MEDICATIONS  (STANDING):  enoxaparin Injectable 30 milliGRAM(s) SubCutaneous daily  influenza   Vaccine 0.5 milliLiter(s) IntraMuscular once  pantoprazole    Tablet 40 milliGRAM(s) Oral before breakfast  piperacillin/tazobactam IVPB. 3.375 Gram(s) IV Intermittent every 12 hours    No edema. No pressure ulcers documented.     Estimated Needs:   [X ] no change since previous assessment  [ ] recalculated:     Previous Nutrition Diagnosis: Severe malnutrition   Nutrition Diagnosis is: ongoing    New Nutrition Diagnosis: N/A      Recommend  1) Continue with current diet as tolerated  2) RD to follow up for further nutritional interventions as indicated/requested by medical team/pt/pt's family.     Monitoring and Evaluation:     Continue to monitor Nutritional intake, Tolerance to diet prescription, weights, labs, skin integrity    RD remains available upon request and will follow up per protocol      Clara Couch RD pager #[phone]

## 2019-03-28 NOTE — PROGRESS NOTE ADULT - ASSESSMENT
89 yo woman with a hx of dementia (AOx1-2 at baseline), HTN, appendectomy, remote colon CA s/p right colectomy with ileocolic anastomosis in 2003 presents with N/V, admitted due to SBO with TP in the left pelvis. The patient is DNR/DNI, with the family wishing to keep her comfortable. Will manage the obstruction non-operatively with NGT decompression and bowel rest.     -c/w dysphagia 2  -pain medication only after exam  -f/u fever w/u, no fevers o/n  -monitor for GI fxn  -f/u AM labs  -lovenox for DVT ppx  -d/c today with home hospice and completion of 10 day course of abx for suspected pneumonia    ACS Surgery  [phone]

## 2019-03-30 LAB
CULTURE RESULTS: SIGNIFICANT CHANGE UP
CULTURE RESULTS: SIGNIFICANT CHANGE UP
SPECIMEN SOURCE: SIGNIFICANT CHANGE UP
SPECIMEN SOURCE: SIGNIFICANT CHANGE UP

## 2020-11-03 NOTE — PROVIDER CONTACT NOTE (OTHER) - ASSESSMENT
Physical Therapy Daily Progress Note  Visit: 5    Giuliana Disla reports: My (R) shdr is starting to feel better.  I am not noticing my back pain as much at night but I notice during the day still.     Subjective     Objective   See Exercise, Manual, and Modality Logs for complete treatment.       Assessment & Plan     Assessment  Assessment details: The pt ming Rx well with MT added today.  The pt is tender along the (L) SI joint.      Plan  Plan details: Progress ROM / strengthening / stabilization / functional activity as tolerated          Manual Therapy:     12     mins  37018;  Therapeutic Exercise:     40     mins  28292;     Neuromuscular Bret:         mins  95438;    Therapeutic Activity:      10     mins  47913;     Gait Training:            mins  97586;     Ultrasound:           mins  44021;    Electrical Stimulation:          mins  67733 ( );  Dry Needling           mins self-pay  Traction           mins [id number]  Canalith Repositioning         mins 66952      Timed Treatment:   62   mins   Total Treatment:     62   mins    LEXIS Garnica License #: [id number]    Physical Therapist       Bright red blood mixed with dark brown stool

## 2024-04-25 NOTE — DISCHARGE NOTE NURSING/CASE MANAGEMENT/SOCIAL WORK - NSDPDISTO_GEN_ALL_CORE
Home with hospice
This was a shared visit with the NICOLE. I reviewed and verified the documentation.